# Patient Record
Sex: MALE | Race: WHITE | NOT HISPANIC OR LATINO | Employment: OTHER | URBAN - METROPOLITAN AREA
[De-identification: names, ages, dates, MRNs, and addresses within clinical notes are randomized per-mention and may not be internally consistent; named-entity substitution may affect disease eponyms.]

---

## 2017-04-19 RX ORDER — ATORVASTATIN CALCIUM 40 MG/1
TABLET, FILM COATED ORAL
Qty: 90 TABLET | Refills: 0 | Status: SHIPPED | OUTPATIENT
Start: 2017-04-19 | End: 2017-07-28 | Stop reason: SDUPTHER

## 2017-04-21 ENCOUNTER — OFFICE VISIT (OUTPATIENT)
Dept: INTERNAL MEDICINE | Age: 62
End: 2017-04-21

## 2017-04-21 VITALS
BODY MASS INDEX: 33 KG/M2 | RESPIRATION RATE: 12 BRPM | SYSTOLIC BLOOD PRESSURE: 120 MMHG | WEIGHT: 249 LBS | DIASTOLIC BLOOD PRESSURE: 60 MMHG | HEART RATE: 72 BPM | HEIGHT: 73 IN

## 2017-04-21 DIAGNOSIS — R35.1 EXCESSIVE URINATION AT NIGHT: ICD-10-CM

## 2017-04-21 DIAGNOSIS — E78.2 MIXED HYPERLIPIDEMIA: Primary | ICD-10-CM

## 2017-04-21 DIAGNOSIS — R91.8 PULMONARY NODULES: ICD-10-CM

## 2017-04-21 LAB
ALBUMIN SERPL-MCNC: 4.3 G/DL (ref 3.5–5.2)
ALBUMIN/GLOB SERPL: 1.7 G/DL
ALP SERPL-CCNC: 89 U/L (ref 39–117)
ALT SERPL-CCNC: 40 U/L (ref 1–41)
AST SERPL-CCNC: 34 U/L (ref 1–40)
BILIRUB SERPL-MCNC: 0.6 MG/DL (ref 0.1–1.2)
BUN SERPL-MCNC: 16 MG/DL (ref 8–23)
BUN/CREAT SERPL: 11.8 (ref 7–25)
CALCIUM SERPL-MCNC: 9.2 MG/DL (ref 8.6–10.5)
CHLORIDE SERPL-SCNC: 104 MMOL/L (ref 98–107)
CHOLEST SERPL-MCNC: 170 MG/DL (ref 0–200)
CO2 SERPL-SCNC: 22.4 MMOL/L (ref 22–29)
CREAT SERPL-MCNC: 1.36 MG/DL (ref 0.76–1.27)
GLOBULIN SER CALC-MCNC: 2.5 GM/DL
GLUCOSE SERPL-MCNC: 100 MG/DL (ref 65–99)
HDLC SERPL-MCNC: 51 MG/DL (ref 40–60)
LDLC SERPL CALC-MCNC: 94 MG/DL (ref 0–100)
POTASSIUM SERPL-SCNC: 4.3 MMOL/L (ref 3.5–5.2)
PROT SERPL-MCNC: 6.8 G/DL (ref 6–8.5)
PSA SERPL-MCNC: 1.12 NG/ML (ref 0–4)
SODIUM SERPL-SCNC: 139 MMOL/L (ref 136–145)
TRIGL SERPL-MCNC: 124 MG/DL (ref 0–150)
VLDLC SERPL CALC-MCNC: 24.8 MG/DL (ref 5–40)

## 2017-04-21 PROCEDURE — 99214 OFFICE O/P EST MOD 30 MIN: CPT | Performed by: INTERNAL MEDICINE

## 2017-04-21 NOTE — PROGRESS NOTES
"  Darek Oakley is a 61 y.o. male who presents with   Chief Complaint   Patient presents with   • Hyperlipidemia     Checkup; lab updates   • Pulmonary nodules.     Benign appearing pulmonary nodules-right lung.  Radiology recommended for April 2017 to end 2 year recommended follow-up   • Nocturia     Nighttime frequency dependent upon fluid consumption prior to bedtime.   .    Hyperlipidemia   This is a chronic problem. The current episode started more than 1 year ago. The problem is controlled. Current antihyperlipidemic treatment includes statins. The current treatment provides moderate improvement of lipids. There are no compliance problems.       Pulmonary nodules: This patient has 3 pulmonary nodules in the right lung that have been followed by radiology since April 20 of 2015.  These nodules appear to be benign in origin but radiology has suggested a total 2 year follow-up which will end this month.  He would like scheduling for a follow-up CT chest scan without contrast \"to put this matter to rest\".    Nocturia: History as outlined above.    The following portions of the patient's history were reviewed and updated as appropriate: allergies, current medications, past medical history and problem list.    Review of Systems   Constitutional: Negative.    HENT: Negative.    Eyes: Negative.    Respiratory: Negative.    Cardiovascular: Negative.    Genitourinary: Negative.    Musculoskeletal: Negative.    Skin: Negative.    Neurological: Negative.    Psychiatric/Behavioral: Negative.        Objective   Physical Exam   Constitutional: He is oriented to person, place, and time. He appears well-developed and well-nourished. No distress.   HENT:   Head: Normocephalic and atraumatic.   Eyes: Conjunctivae and EOM are normal. Pupils are equal, round, and reactive to light.   Neck: Normal range of motion. Neck supple. No thyromegaly present.   Neck exam negative.  Carotid auscultation normal-no bruits heard. "   Cardiovascular: Normal rate, regular rhythm, normal heart sounds and intact distal pulses.  Exam reveals no gallop and no friction rub.    No murmur heard.  Pulmonary/Chest: Effort normal and breath sounds normal. No respiratory distress. He has no wheezes. He has no rales. He exhibits no tenderness.   Neurological: He is alert and oriented to person, place, and time.   Psychiatric: He has a normal mood and affect. His behavior is normal. Judgment and thought content normal.   Nursing note and vitals reviewed.      Assessment/Plan   Darek was seen today for hyperlipidemia, pulmonary nodules. and nocturia.    Diagnoses and all orders for this visit:    Mixed hyperlipidemia  -     Comprehensive Metabolic Panel  -     Lipid Panel    Pulmonary nodules  -     CT Chest Without Contrast; Future    Excessive urination at night  -     PSA      Plan: Labs as above.Today's exam unremarkable for any new problems or events.  Continue all current treatment as prescribed.  A follow-up checkup/lab update visit is advised for 6 months.

## 2017-04-24 DIAGNOSIS — K21.9 GASTROESOPHAGEAL REFLUX DISEASE, ESOPHAGITIS PRESENCE NOT SPECIFIED: Primary | ICD-10-CM

## 2017-04-24 RX ORDER — OMEPRAZOLE 20 MG/1
CAPSULE, DELAYED RELEASE ORAL
Qty: 90 CAPSULE | Refills: 1 | Status: SHIPPED | OUTPATIENT
Start: 2017-04-24 | End: 2017-10-23 | Stop reason: SDUPTHER

## 2017-04-26 ENCOUNTER — HOSPITAL ENCOUNTER (OUTPATIENT)
Dept: CT IMAGING | Facility: HOSPITAL | Age: 62
Discharge: HOME | End: 2017-04-26
Admitting: INTERNAL MEDICINE

## 2017-04-26 DIAGNOSIS — R91.8 PULMONARY NODULES: ICD-10-CM

## 2017-04-26 PROCEDURE — 71250 CT THORAX DX C-: CPT

## 2017-05-01 ENCOUNTER — TELEPHONE (OUTPATIENT)
Dept: INTERNAL MEDICINE | Age: 62
End: 2017-05-01

## 2017-05-30 ENCOUNTER — OFFICE VISIT (OUTPATIENT)
Dept: INTERNAL MEDICINE | Age: 62
End: 2017-05-30

## 2017-05-30 VITALS
RESPIRATION RATE: 12 BRPM | SYSTOLIC BLOOD PRESSURE: 120 MMHG | DIASTOLIC BLOOD PRESSURE: 62 MMHG | HEART RATE: 70 BPM | BODY MASS INDEX: 32.47 KG/M2 | WEIGHT: 245 LBS | HEIGHT: 73 IN

## 2017-05-30 DIAGNOSIS — D17.1 LIPOMA OF TORSO: ICD-10-CM

## 2017-05-30 DIAGNOSIS — M72.2 PLANTAR FASCIITIS: ICD-10-CM

## 2017-05-30 DIAGNOSIS — J01.10 ACUTE NON-RECURRENT FRONTAL SINUSITIS: Primary | ICD-10-CM

## 2017-05-30 PROCEDURE — 99214 OFFICE O/P EST MOD 30 MIN: CPT | Performed by: INTERNAL MEDICINE

## 2017-05-30 RX ORDER — AZITHROMYCIN 250 MG/1
TABLET, FILM COATED ORAL
Qty: 6 TABLET | Refills: 0 | Status: SHIPPED | OUTPATIENT
Start: 2017-05-30 | End: 2017-10-23 | Stop reason: SDUPTHER

## 2017-07-31 RX ORDER — ATORVASTATIN CALCIUM 40 MG/1
TABLET, FILM COATED ORAL
Qty: 90 TABLET | Refills: 0 | Status: SHIPPED | OUTPATIENT
Start: 2017-07-31 | End: 2017-10-23 | Stop reason: SDUPTHER

## 2017-10-23 ENCOUNTER — OFFICE VISIT (OUTPATIENT)
Dept: INTERNAL MEDICINE | Age: 62
End: 2017-10-23

## 2017-10-23 VITALS
WEIGHT: 247 LBS | DIASTOLIC BLOOD PRESSURE: 60 MMHG | SYSTOLIC BLOOD PRESSURE: 120 MMHG | OXYGEN SATURATION: 97 % | HEART RATE: 70 BPM | TEMPERATURE: 97.3 F | HEIGHT: 73 IN | BODY MASS INDEX: 32.74 KG/M2 | RESPIRATION RATE: 12 BRPM

## 2017-10-23 DIAGNOSIS — K21.9 GASTROESOPHAGEAL REFLUX DISEASE, ESOPHAGITIS PRESENCE NOT SPECIFIED: ICD-10-CM

## 2017-10-23 DIAGNOSIS — E78.2 MIXED HYPERLIPIDEMIA: Primary | ICD-10-CM

## 2017-10-23 LAB
ALBUMIN SERPL-MCNC: 4.7 G/DL (ref 3.5–5.2)
ALBUMIN/GLOB SERPL: 2 G/DL
ALP SERPL-CCNC: 95 U/L (ref 39–117)
ALT SERPL-CCNC: 43 U/L (ref 1–41)
AST SERPL-CCNC: 32 U/L (ref 1–40)
BILIRUB SERPL-MCNC: 0.5 MG/DL (ref 0.1–1.2)
BUN SERPL-MCNC: 17 MG/DL (ref 8–23)
BUN/CREAT SERPL: 12.5 (ref 7–25)
CALCIUM SERPL-MCNC: 9.5 MG/DL (ref 8.6–10.5)
CHLORIDE SERPL-SCNC: 101 MMOL/L (ref 98–107)
CHOLEST SERPL-MCNC: 182 MG/DL (ref 0–200)
CO2 SERPL-SCNC: 24.5 MMOL/L (ref 22–29)
CREAT SERPL-MCNC: 1.36 MG/DL (ref 0.76–1.27)
GFR SERPLBLD CREATININE-BSD FMLA CKD-EPI: 53 ML/MIN/1.73
GFR SERPLBLD CREATININE-BSD FMLA CKD-EPI: 65 ML/MIN/1.73
GLOBULIN SER CALC-MCNC: 2.3 GM/DL
GLUCOSE SERPL-MCNC: 97 MG/DL (ref 65–99)
HDLC SERPL-MCNC: 44 MG/DL (ref 40–60)
LDLC SERPL CALC-MCNC: 110 MG/DL (ref 0–100)
POTASSIUM SERPL-SCNC: 4.6 MMOL/L (ref 3.5–5.2)
PROT SERPL-MCNC: 7 G/DL (ref 6–8.5)
SODIUM SERPL-SCNC: 141 MMOL/L (ref 136–145)
TRIGL SERPL-MCNC: 141 MG/DL (ref 0–150)
VLDLC SERPL CALC-MCNC: 28.2 MG/DL (ref 5–40)

## 2017-10-23 PROCEDURE — 99213 OFFICE O/P EST LOW 20 MIN: CPT | Performed by: INTERNAL MEDICINE

## 2017-10-23 RX ORDER — LEVOCETIRIZINE DIHYDROCHLORIDE 5 MG/1
TABLET, FILM COATED ORAL
Refills: 0 | COMMUNITY
Start: 2017-08-01 | End: 2017-10-23 | Stop reason: SDUPTHER

## 2017-10-23 RX ORDER — ATORVASTATIN CALCIUM 40 MG/1
40 TABLET, FILM COATED ORAL NIGHTLY
Qty: 90 TABLET | Refills: 1 | Status: SHIPPED | OUTPATIENT
Start: 2017-10-23 | End: 2018-04-17 | Stop reason: SDUPTHER

## 2017-10-23 RX ORDER — HYDROCORTISONE ACETATE AND PRAMOXINE HYDROCHLORIDE 25; 10 MG/ML; MG/ML
LOTION TOPICAL
Refills: 0 | COMMUNITY
Start: 2017-08-08 | End: 2017-10-23 | Stop reason: SDUPTHER

## 2017-10-23 RX ORDER — IBUPROFEN 800 MG/1
TABLET ORAL
Refills: 0 | COMMUNITY
Start: 2017-09-27 | End: 2019-05-06

## 2017-10-23 RX ORDER — OMEPRAZOLE 20 MG/1
20 CAPSULE, DELAYED RELEASE ORAL DAILY
Qty: 90 CAPSULE | Refills: 1 | Status: SHIPPED | OUTPATIENT
Start: 2017-10-23 | End: 2018-04-17 | Stop reason: SDUPTHER

## 2017-10-23 NOTE — PROGRESS NOTES
Darek Oakley is a 62 y.o. male who presents with   Chief Complaint   Patient presents with   • Hyperlipidemia     Checkup; lab updates.  Currently taking atorvastatin 40 mg daily   .    Hyperlipidemia   This is a chronic problem. The current episode started more than 1 year ago. The problem is controlled. Recent lipid tests were reviewed and are normal. Current antihyperlipidemic treatment includes statins. The current treatment provides moderate improvement of lipids. There are no compliance problems.         The following portions of the patient's history were reviewed and updated as appropriate: allergies, current medications, past medical history and problem list.    Review of Systems   Constitutional: Negative.    Respiratory: Negative.    Cardiovascular: Negative.    Genitourinary: Negative.    Musculoskeletal: Negative.         History of plantar fasciitis.  Currently seeing podiatry-    Neurological: Negative.    Psychiatric/Behavioral: Negative.        Objective   Physical Exam   Constitutional: He is oriented to person, place, and time. He appears well-developed and well-nourished.   HENT:   Head: Normocephalic and atraumatic.   Eyes: EOM are normal. Pupils are equal, round, and reactive to light.   Cardiovascular: Normal rate.    Pulmonary/Chest: Effort normal.   Neurological: He is alert and oriented to person, place, and time.   Psychiatric: He has a normal mood and affect. His behavior is normal. Judgment and thought content normal.   Nursing note and vitals reviewed.      Assessment/Plan   Darek was seen today for hyperlipidemia.    Diagnoses and all orders for this visit:    Mixed hyperlipidemia  -     Comprehensive Metabolic Panel  -     Lipid Panel        Plan: Labs as above.Today's exam unremarkable for any new problems or events.  Continue all current treatment as prescribed.  A follow-up checkup/lab update visit is advised for 6 months.

## 2018-03-19 ENCOUNTER — OFFICE VISIT (OUTPATIENT)
Dept: INTERNAL MEDICINE | Age: 63
End: 2018-03-19

## 2018-03-19 VITALS
WEIGHT: 251 LBS | TEMPERATURE: 98 F | HEIGHT: 73 IN | OXYGEN SATURATION: 97 % | HEART RATE: 79 BPM | BODY MASS INDEX: 33.27 KG/M2

## 2018-03-19 DIAGNOSIS — N45.1 EPIDIDYMITIS: Primary | ICD-10-CM

## 2018-03-19 DIAGNOSIS — L72.9 SCROTAL CYST: ICD-10-CM

## 2018-03-19 PROCEDURE — 99213 OFFICE O/P EST LOW 20 MIN: CPT | Performed by: INTERNAL MEDICINE

## 2018-03-19 RX ORDER — SULFAMETHOXAZOLE AND TRIMETHOPRIM 800; 160 MG/1; MG/1
1 TABLET ORAL 2 TIMES DAILY
Qty: 14 TABLET | Refills: 0 | Status: SHIPPED | OUTPATIENT
Start: 2018-03-19 | End: 2018-04-23

## 2018-03-19 NOTE — PROGRESS NOTES
"  Darek Oakley is a 62 y.o. male who presents with   Chief Complaint   Patient presents with   • Painful/swollen left testicle   .    History of Present Illness -history as above.  It has been present for 2 weeks.  He says he feels a \"knot\" in the left scrotal area.  He has a prior history of a Vasectomy.  Also has had a left hernia repair in 1985.    The following portions of the patient's history were reviewed and updated as appropriate: allergies, current medications, past medical history and problem list.    Review of Systems   Constitutional: Negative.    Respiratory: Negative.    Cardiovascular: Negative.    Genitourinary: Positive for scrotal swelling and testicular pain.   Neurological: Negative.    Psychiatric/Behavioral: Negative.        Objective   Physical Exam   Constitutional: He is oriented to person, place, and time. He appears well-developed and well-nourished.   HENT:   Head: Normocephalic and atraumatic.   Eyes: EOM are normal. Pupils are equal, round, and reactive to light.   Cardiovascular: Normal rate.    Pulmonary/Chest: Effort normal.   Genitourinary:   Genitourinary Comments: Tenderness with a nodular density in the left scrotal area.  I do not feel an actual lump on the testicle itself.  There does not appear to be any palpable hernia in either inguinal canal.   Neurological: He is alert and oriented to person, place, and time.   Psychiatric: He has a normal mood and affect. His behavior is normal. Judgment and thought content normal.   Nursing note and vitals reviewed.      Assessment/Plan   Darek was seen today for painful/swollen left testicle.    Diagnoses and all orders for this visit:    Epididymitis  -     US Testicular or Ovarian Vascular Complete; Future  -     sulfamethoxazole-trimethoprim (BACTRIM DS,SEPTRA DS) 800-160 MG per tablet; Take 1 tablet by mouth 2 (Two) Times a Day.    Scrotal cyst  -     US Testicular or Ovarian Vascular Complete; Future        Plan: Treatment as " above.  Ultrasound as above.  Advil, 2 tablets 3 times a day with food.  Follow-up as needed.

## 2018-03-21 DIAGNOSIS — N45.1 EPIDIDYMITIS: Primary | ICD-10-CM

## 2018-03-26 ENCOUNTER — HOSPITAL ENCOUNTER (OUTPATIENT)
Dept: ULTRASOUND IMAGING | Facility: HOSPITAL | Age: 63
Discharge: HOME | End: 2018-03-26
Admitting: INTERNAL MEDICINE

## 2018-03-26 DIAGNOSIS — N45.1 EPIDIDYMITIS: ICD-10-CM

## 2018-03-26 DIAGNOSIS — L72.9 SCROTAL CYST: ICD-10-CM

## 2018-03-26 PROCEDURE — 76870 US EXAM SCROTUM: CPT

## 2018-03-26 PROCEDURE — 93975 VASCULAR STUDY: CPT

## 2018-03-28 ENCOUNTER — TELEPHONE (OUTPATIENT)
Dept: INTERNAL MEDICINE | Age: 63
End: 2018-03-28

## 2018-03-28 DIAGNOSIS — N43.0 ENCYSTED HYDROCELE: Primary | ICD-10-CM

## 2018-03-28 NOTE — TELEPHONE ENCOUNTER
Spoke with pt he said they have not got any bigger but are still very painful and would like to proceed with urology referral.

## 2018-03-28 NOTE — TELEPHONE ENCOUNTER
----- Message from Yoel Peacock MD sent at 3/27/2018  7:09 PM EDT -----  Ultrasound of testicles shows no evidence of any tumor or mass. There are hydroceles ( fluid filled cysts) on both sides greater on the left and this likely represents the knot you feel. These are benign and are of no significance unless they enlarge of become painful in which case a Urology referral would be advised. No further action is needed.

## 2018-03-28 NOTE — TELEPHONE ENCOUNTER
Go ahead and take care of it as per request.  To Dr. Joseph [id number] -left hydrocele-pain

## 2018-04-17 DIAGNOSIS — K21.9 GASTROESOPHAGEAL REFLUX DISEASE, ESOPHAGITIS PRESENCE NOT SPECIFIED: ICD-10-CM

## 2018-04-17 RX ORDER — ATORVASTATIN CALCIUM 40 MG/1
TABLET, FILM COATED ORAL
Qty: 90 TABLET | Refills: 1 | Status: SHIPPED | OUTPATIENT
Start: 2018-04-17 | End: 2018-10-23 | Stop reason: SDUPTHER

## 2018-04-17 RX ORDER — OMEPRAZOLE 20 MG/1
CAPSULE, DELAYED RELEASE ORAL
Qty: 90 CAPSULE | Refills: 1 | Status: SHIPPED | OUTPATIENT
Start: 2018-04-17 | End: 2018-10-23 | Stop reason: SDUPTHER

## 2018-04-23 ENCOUNTER — OFFICE VISIT (OUTPATIENT)
Dept: INTERNAL MEDICINE | Age: 63
End: 2018-04-23

## 2018-04-23 VITALS
BODY MASS INDEX: 33.4 KG/M2 | DIASTOLIC BLOOD PRESSURE: 60 MMHG | HEIGHT: 73 IN | SYSTOLIC BLOOD PRESSURE: 120 MMHG | TEMPERATURE: 97.2 F | HEART RATE: 59 BPM | WEIGHT: 252 LBS | OXYGEN SATURATION: 98 % | RESPIRATION RATE: 12 BRPM

## 2018-04-23 DIAGNOSIS — E78.2 MIXED HYPERLIPIDEMIA: Primary | ICD-10-CM

## 2018-04-23 DIAGNOSIS — R35.1 EXCESSIVE URINATION AT NIGHT: ICD-10-CM

## 2018-04-23 LAB
ALBUMIN SERPL-MCNC: 4.1 G/DL (ref 3.5–5.2)
ALBUMIN/GLOB SERPL: 1.7 G/DL
ALP SERPL-CCNC: 80 U/L (ref 39–117)
ALT SERPL-CCNC: 39 U/L (ref 1–41)
AST SERPL-CCNC: 33 U/L (ref 1–40)
BILIRUB SERPL-MCNC: 0.4 MG/DL (ref 0.1–1.2)
BUN SERPL-MCNC: 16 MG/DL (ref 8–23)
BUN/CREAT SERPL: 11.4 (ref 7–25)
CALCIUM SERPL-MCNC: 9.5 MG/DL (ref 8.6–10.5)
CHLORIDE SERPL-SCNC: 101 MMOL/L (ref 98–107)
CHOLEST SERPL-MCNC: 185 MG/DL (ref 0–200)
CO2 SERPL-SCNC: 23.8 MMOL/L (ref 22–29)
CREAT SERPL-MCNC: 1.4 MG/DL (ref 0.76–1.27)
GFR SERPLBLD CREATININE-BSD FMLA CKD-EPI: 52 ML/MIN/1.73
GFR SERPLBLD CREATININE-BSD FMLA CKD-EPI: 62 ML/MIN/1.73
GLOBULIN SER CALC-MCNC: 2.4 GM/DL
GLUCOSE SERPL-MCNC: 95 MG/DL (ref 65–99)
HDLC SERPL-MCNC: 50 MG/DL (ref 40–60)
LDLC SERPL CALC-MCNC: 109 MG/DL (ref 0–100)
POTASSIUM SERPL-SCNC: 4.2 MMOL/L (ref 3.5–5.2)
PROT SERPL-MCNC: 6.5 G/DL (ref 6–8.5)
PSA SERPL-MCNC: 1.52 NG/ML (ref 0–4)
SODIUM SERPL-SCNC: 138 MMOL/L (ref 136–145)
TRIGL SERPL-MCNC: 129 MG/DL (ref 0–150)
VLDLC SERPL CALC-MCNC: 25.8 MG/DL (ref 5–40)

## 2018-04-23 PROCEDURE — 99214 OFFICE O/P EST MOD 30 MIN: CPT | Performed by: INTERNAL MEDICINE

## 2018-04-23 NOTE — PROGRESS NOTES
Darek Oakley is a 62 y.o. male who presents with   Chief Complaint   Patient presents with   • Hyperlipidemia   • Nocturia   .    62-year-old male presents for his six-month checkup/lab update visit.  No specific complaints or problems other than some occasional plantar fasciitis.  He does see podiatry-Dr. Grover Genao for this as needed.       Hyperlipidemia   This is a chronic problem. The current episode started more than 1 year ago. The problem is controlled. Current antihyperlipidemic treatment includes statins. The current treatment provides moderate improvement of lipids. There are no compliance problems.        Nocturia: Nighttime urinary frequency-dependent upon fluid consumption prior to bedtime.  No fever, chills or dysuria.  No visible hematuria.    The following portions of the patient's history were reviewed and updated as appropriate: allergies, current medications, past medical history and problem list.    Review of Systems   Constitutional: Negative.    HENT: Negative.    Eyes: Negative.    Respiratory: Negative.    Cardiovascular: Negative.    Genitourinary: Negative.    Musculoskeletal: Negative.    Skin: Negative.    Neurological: Negative.    Psychiatric/Behavioral: Negative.        Objective   Physical Exam   Constitutional: He is oriented to person, place, and time. He appears well-developed and well-nourished. No distress.   HENT:   Head: Normocephalic and atraumatic.   Eyes: Conjunctivae and EOM are normal. Pupils are equal, round, and reactive to light.   Neck: Normal range of motion. Neck supple. No thyromegaly present.   Neck exam negative.  Carotid auscultation normal-no bruits heard.   Cardiovascular: Normal rate, regular rhythm, normal heart sounds and intact distal pulses.  Exam reveals no gallop and no friction rub.    No murmur heard.  Pulmonary/Chest: Effort normal and breath sounds normal. No respiratory distress. He has no wheezes. He has no rales. He exhibits no tenderness.    Neurological: He is alert and oriented to person, place, and time.   Psychiatric: He has a normal mood and affect. His behavior is normal. Judgment and thought content normal.   Nursing note and vitals reviewed.      Assessment/Plan   Darek was seen today for hyperlipidemia and nocturia.    Diagnoses and all orders for this visit:    Mixed hyperlipidemia  -     Comprehensive Metabolic Panel  -     Lipid Panel    Excessive urination at night  -     PSA DIAGNOSTIC      Plan: Labs as above.Today's exam unremarkable for any new problems or events.  Continue all current treatment as prescribed.  A follow-up checkup/lab update visit is advised for 6 months assuming today's labs are all acceptable.

## 2018-07-24 ENCOUNTER — OFFICE VISIT (OUTPATIENT)
Dept: INTERNAL MEDICINE | Age: 63
End: 2018-07-24

## 2018-07-24 ENCOUNTER — HOSPITAL ENCOUNTER (OUTPATIENT)
Dept: GENERAL RADIOLOGY | Facility: HOSPITAL | Age: 63
Discharge: HOME | End: 2018-07-24
Admitting: INTERNAL MEDICINE

## 2018-07-24 VITALS
TEMPERATURE: 97.1 F | RESPIRATION RATE: 13 BRPM | DIASTOLIC BLOOD PRESSURE: 70 MMHG | WEIGHT: 248 LBS | SYSTOLIC BLOOD PRESSURE: 110 MMHG | BODY MASS INDEX: 32.87 KG/M2 | HEART RATE: 92 BPM | OXYGEN SATURATION: 97 % | HEIGHT: 73 IN

## 2018-07-24 DIAGNOSIS — M54.50 ACUTE BILATERAL LOW BACK PAIN WITHOUT SCIATICA: Primary | ICD-10-CM

## 2018-07-24 PROCEDURE — 99214 OFFICE O/P EST MOD 30 MIN: CPT | Performed by: INTERNAL MEDICINE

## 2018-07-24 PROCEDURE — 72100 X-RAY EXAM L-S SPINE 2/3 VWS: CPT

## 2018-07-24 RX ORDER — HYDROCORTISONE ACETATE AND PRAMOXINE HYDROCHLORIDE 25; 10 MG/ML; MG/ML
LOTION TOPICAL
COMMUNITY
Start: 2018-07-11 | End: 2019-05-06

## 2018-07-24 NOTE — PROGRESS NOTES
"  Darek Oakley is a 62 y.o. male who presents with   Chief Complaint   Patient presents with   • Back Pain   .    62-year-old male presents with low back pain that occurred several days ago on the 13th after he picked up something from his deck.  He had muscle spasms severely back then he said but presently he is \"80-90 percent better\".  He treated himself with ibuprofen 800 mg 3 times a day and heat applications.  He denies any radicular pain.      Back Pain   This is a new problem. The current episode started 1 to 4 weeks ago. The problem occurs intermittently. The problem has been gradually improving since onset. The pain is present in the lumbar spine and sacro-iliac. The pain does not radiate. The pain is moderate. The symptoms are aggravated by bending and position. Stiffness is present in the morning. Pertinent negatives include no numbness. He has tried NSAIDs and heat for the symptoms. The treatment provided moderate relief.        The following portions of the patient's history were reviewed and updated as appropriate: allergies, current medications, past medical history and problem list.    Review of Systems   Constitutional: Negative.    HENT: Negative.    Eyes: Negative.    Respiratory: Negative.    Cardiovascular: Negative.    Genitourinary: Negative.    Musculoskeletal: Positive for back pain.   Skin: Negative.    Neurological: Negative.  Negative for numbness.   Psychiatric/Behavioral: Negative.        Objective   Physical Exam   Constitutional: He is oriented to person, place, and time. He appears well-developed and well-nourished. No distress.   HENT:   Head: Normocephalic and atraumatic.   Eyes: Pupils are equal, round, and reactive to light. Conjunctivae and EOM are normal.   Neck: Normal range of motion. Neck supple. No thyromegaly present.   Neck exam negative.  Carotid auscultation normal-no bruits heard.   Cardiovascular: Normal rate, regular rhythm, normal heart sounds and intact distal " pulses.  Exam reveals no gallop and no friction rub.    No murmur heard.  Pulmonary/Chest: Effort normal and breath sounds normal. No respiratory distress. He has no wheezes. He has no rales. He exhibits no tenderness.   Musculoskeletal: Normal range of motion. He exhibits no edema, tenderness or deformity.   Neurological: He is alert and oriented to person, place, and time. He displays normal reflexes. No cranial nerve deficit or sensory deficit. He exhibits normal muscle tone. Coordination normal.   Psychiatric: He has a normal mood and affect. His behavior is normal. Judgment and thought content normal.   Nursing note and vitals reviewed.      Assessment/Plan   Darek was seen today for back pain.    Diagnoses and all orders for this visit:    Acute bilateral low back pain without sciatica  -     XR Spine Lumbar AP & Lateral        Plan: Continue ibuprofen 800 mg 3 times a day along with cold compresses instead of heat.  Follow-up will be dictated by need or lack of response to current treatment.    We will send him to x-ray for a plain x-ray of the lumbosacral spine today.

## 2018-07-25 ENCOUNTER — TELEPHONE (OUTPATIENT)
Dept: INTERNAL MEDICINE | Age: 63
End: 2018-07-25

## 2018-07-25 NOTE — TELEPHONE ENCOUNTER
----- Message from Yoel Peacock MD sent at 7/25/2018  7:23 AM EDT -----  Call the patient with this report.    X-ray of the lumbar spine shows some spurring (calcium buildup) at the L3-4 and L4-5 vertebra.  This is a very common finding and needs no other radiographic analysis at this time.  The rest of the lumbar spine appears to be essentially within normal limits.  Continue treatment as advised.  Follow-up as needed.

## 2018-07-26 ENCOUNTER — TELEPHONE (OUTPATIENT)
Dept: INTERNAL MEDICINE | Age: 63
End: 2018-07-26

## 2018-10-23 ENCOUNTER — TELEPHONE (OUTPATIENT)
Dept: INTERNAL MEDICINE | Age: 63
End: 2018-10-23

## 2018-10-23 ENCOUNTER — OFFICE VISIT (OUTPATIENT)
Dept: INTERNAL MEDICINE | Age: 63
End: 2018-10-23

## 2018-10-23 VITALS
WEIGHT: 245 LBS | OXYGEN SATURATION: 98 % | HEIGHT: 73 IN | SYSTOLIC BLOOD PRESSURE: 110 MMHG | DIASTOLIC BLOOD PRESSURE: 60 MMHG | BODY MASS INDEX: 32.47 KG/M2 | TEMPERATURE: 98.2 F | HEART RATE: 64 BPM | RESPIRATION RATE: 13 BRPM

## 2018-10-23 DIAGNOSIS — K21.9 GASTROESOPHAGEAL REFLUX DISEASE, ESOPHAGITIS PRESENCE NOT SPECIFIED: ICD-10-CM

## 2018-10-23 DIAGNOSIS — E78.2 MIXED HYPERLIPIDEMIA: Primary | ICD-10-CM

## 2018-10-23 LAB
ALBUMIN SERPL-MCNC: 4.7 G/DL (ref 3.5–5.2)
ALBUMIN/GLOB SERPL: 1.7 G/DL
ALP SERPL-CCNC: 109 U/L (ref 39–117)
ALT SERPL-CCNC: 35 U/L (ref 1–41)
AST SERPL-CCNC: 29 U/L (ref 1–40)
BILIRUB SERPL-MCNC: 0.5 MG/DL (ref 0.1–1.2)
BUN SERPL-MCNC: 15 MG/DL (ref 8–23)
BUN/CREAT SERPL: 9.7 (ref 7–25)
CALCIUM SERPL-MCNC: 10 MG/DL (ref 8.6–10.5)
CHLORIDE SERPL-SCNC: 101 MMOL/L (ref 98–107)
CHOLEST SERPL-MCNC: 229 MG/DL (ref 0–200)
CO2 SERPL-SCNC: 25.5 MMOL/L (ref 22–29)
CREAT SERPL-MCNC: 1.55 MG/DL (ref 0.76–1.27)
GLOBULIN SER CALC-MCNC: 2.8 GM/DL
GLUCOSE SERPL-MCNC: 99 MG/DL (ref 65–99)
HDLC SERPL-MCNC: 51 MG/DL (ref 40–60)
LDLC SERPL CALC-MCNC: 155 MG/DL (ref 0–100)
POTASSIUM SERPL-SCNC: 4.9 MMOL/L (ref 3.5–5.2)
PROT SERPL-MCNC: 7.5 G/DL (ref 6–8.5)
SODIUM SERPL-SCNC: 139 MMOL/L (ref 136–145)
TRIGL SERPL-MCNC: 117 MG/DL (ref 0–150)
VLDLC SERPL CALC-MCNC: 23.4 MG/DL (ref 5–40)

## 2018-10-23 PROCEDURE — 99214 OFFICE O/P EST MOD 30 MIN: CPT | Performed by: INTERNAL MEDICINE

## 2018-10-23 RX ORDER — ATORVASTATIN CALCIUM 40 MG/1
40 TABLET, FILM COATED ORAL EVERY EVENING
Qty: 90 TABLET | Refills: 1 | Status: SHIPPED | OUTPATIENT
Start: 2018-10-23 | End: 2019-04-18 | Stop reason: SDUPTHER

## 2018-10-23 RX ORDER — OMEPRAZOLE 20 MG/1
20 CAPSULE, DELAYED RELEASE ORAL DAILY
Qty: 90 CAPSULE | Refills: 1 | Status: SHIPPED | OUTPATIENT
Start: 2018-10-23 | End: 2019-04-18 | Stop reason: SDUPTHER

## 2018-10-23 NOTE — PROGRESS NOTES
Darek Oakley is a 63 y.o. male who presents with   Chief Complaint   Patient presents with   • Hyperlipidemia     Six-month checkup/lab update visit.  No complaints or problems.   • Requests flu shot   .    63-year-old male.  Six-month checkup/lab update visit.  No complaints on today's visit.  Requests flu shot as noted above.      Hyperlipidemia   This is a chronic problem. The current episode started more than 1 year ago. The problem is controlled. Recent lipid tests were reviewed and are normal. Current antihyperlipidemic treatment includes statins. The current treatment provides significant improvement of lipids. There are no compliance problems.         The following portions of the patient's history were reviewed and updated as appropriate: allergies, current medications, past medical history and problem list.    Review of Systems   Constitutional: Negative.    HENT: Negative.    Eyes: Negative.    Respiratory: Negative.    Cardiovascular: Negative.    Genitourinary: Negative.    Musculoskeletal: Negative.    Skin: Negative.    Neurological: Negative.    Psychiatric/Behavioral: Negative.        Objective   Physical Exam   Constitutional: He is oriented to person, place, and time. He appears well-developed and well-nourished. No distress.   HENT:   Head: Normocephalic and atraumatic.   Eyes: Pupils are equal, round, and reactive to light. Conjunctivae and EOM are normal.   Neck: Normal range of motion. Neck supple. No thyromegaly present.   Neck exam negative.  Carotid auscultation normal-no bruits heard.   Cardiovascular: Normal rate, regular rhythm, normal heart sounds and intact distal pulses.  Exam reveals no gallop and no friction rub.    No murmur heard.  Pulmonary/Chest: Effort normal and breath sounds normal. No respiratory distress. He has no wheezes. He has no rales. He exhibits no tenderness.   Neurological: He is alert and oriented to person, place, and time.   Psychiatric: He has a normal mood  and affect. His behavior is normal. Judgment and thought content normal.   Nursing note and vitals reviewed.      Assessment/Plan   Darek was seen today for hyperlipidemia and requests flu shot.    Diagnoses and all orders for this visit:    Mixed hyperlipidemia  -     Comprehensive Metabolic Panel  -     Lipid Panel        Plan: Labs as above.Today's exam unremarkable for any new problems or events.  Continue all current treatment as prescribed.  A follow-up checkup/lab update visit is advised for 6 months assuming today's labs are all acceptable.    Flu shot today per request.

## 2018-10-23 NOTE — TELEPHONE ENCOUNTER
atorvastatin (LIPITOR) 40 MG tablet  omeprazole (priLOSEC) 20 MG capsule    [id number] - [facility] [address] AT [facility] OF [facility] - [phone] [facility] [phone] FX    Pt seen today need refills    Thank you

## 2019-04-18 DIAGNOSIS — K21.9 GASTROESOPHAGEAL REFLUX DISEASE, ESOPHAGITIS PRESENCE NOT SPECIFIED: ICD-10-CM

## 2019-04-18 RX ORDER — OMEPRAZOLE 20 MG/1
20 CAPSULE, DELAYED RELEASE ORAL DAILY
Qty: 90 CAPSULE | Refills: 0 | Status: SHIPPED | OUTPATIENT
Start: 2019-04-18 | End: 2019-05-06 | Stop reason: SDUPTHER

## 2019-04-18 RX ORDER — ATORVASTATIN CALCIUM 40 MG/1
40 TABLET, FILM COATED ORAL EVERY EVENING
Qty: 90 TABLET | Refills: 0 | Status: SHIPPED | OUTPATIENT
Start: 2019-04-18 | End: 2019-05-06 | Stop reason: SDUPTHER

## 2019-05-06 ENCOUNTER — OFFICE VISIT (OUTPATIENT)
Dept: INTERNAL MEDICINE | Age: 64
End: 2019-05-06

## 2019-05-06 VITALS
WEIGHT: 245.8 LBS | HEART RATE: 62 BPM | BODY MASS INDEX: 32.58 KG/M2 | DIASTOLIC BLOOD PRESSURE: 70 MMHG | TEMPERATURE: 97.9 F | SYSTOLIC BLOOD PRESSURE: 120 MMHG | HEIGHT: 73 IN | OXYGEN SATURATION: 98 % | RESPIRATION RATE: 13 BRPM

## 2019-05-06 DIAGNOSIS — E78.2 MIXED HYPERLIPIDEMIA: Primary | ICD-10-CM

## 2019-05-06 DIAGNOSIS — K21.9 GASTROESOPHAGEAL REFLUX DISEASE, ESOPHAGITIS PRESENCE NOT SPECIFIED: ICD-10-CM

## 2019-05-06 DIAGNOSIS — Z00.00 HEALTHCARE MAINTENANCE: ICD-10-CM

## 2019-05-06 LAB
ALBUMIN SERPL-MCNC: 4.6 G/DL (ref 3.5–5.2)
ALBUMIN/GLOB SERPL: 2 G/DL
ALP SERPL-CCNC: 105 U/L (ref 39–117)
ALT SERPL-CCNC: 33 U/L (ref 1–41)
AST SERPL-CCNC: 27 U/L (ref 1–40)
BILIRUB SERPL-MCNC: 0.5 MG/DL (ref 0.2–1.2)
BUN SERPL-MCNC: 16 MG/DL (ref 8–23)
BUN/CREAT SERPL: 11.3 (ref 7–25)
CALCIUM SERPL-MCNC: 9.8 MG/DL (ref 8.6–10.5)
CHLORIDE SERPL-SCNC: 104 MMOL/L (ref 98–107)
CHOLEST SERPL-MCNC: 152 MG/DL (ref 0–200)
CO2 SERPL-SCNC: 25.8 MMOL/L (ref 22–29)
CREAT SERPL-MCNC: 1.42 MG/DL (ref 0.76–1.27)
GLOBULIN SER CALC-MCNC: 2.3 GM/DL
GLUCOSE SERPL-MCNC: 102 MG/DL (ref 65–99)
HDLC SERPL-MCNC: 42 MG/DL (ref 40–60)
LDLC SERPL CALC-MCNC: 86 MG/DL (ref 0–100)
POTASSIUM SERPL-SCNC: 4.7 MMOL/L (ref 3.5–5.2)
PROT SERPL-MCNC: 6.9 G/DL (ref 6–8.5)
SODIUM SERPL-SCNC: 141 MMOL/L (ref 136–145)
TRIGL SERPL-MCNC: 119 MG/DL (ref 0–150)
VLDLC SERPL CALC-MCNC: 23.8 MG/DL

## 2019-05-06 PROCEDURE — 99214 OFFICE O/P EST MOD 30 MIN: CPT | Performed by: INTERNAL MEDICINE

## 2019-05-06 RX ORDER — ATORVASTATIN CALCIUM 40 MG/1
40 TABLET, FILM COATED ORAL EVERY EVENING
Qty: 90 TABLET | Refills: 1 | Status: SHIPPED | OUTPATIENT
Start: 2019-05-06 | End: 2019-07-15 | Stop reason: SDUPTHER

## 2019-05-06 RX ORDER — OMEPRAZOLE 20 MG/1
20 CAPSULE, DELAYED RELEASE ORAL DAILY
Qty: 90 CAPSULE | Refills: 1 | Status: SHIPPED | OUTPATIENT
Start: 2019-05-06 | End: 2019-07-15 | Stop reason: SDUPTHER

## 2019-05-06 NOTE — PROGRESS NOTES
"  Darek Oakley is a 63 y.o. male who presents with   Chief Complaint   Patient presents with   • Hyperlipidemia   • Requests referral for colonoscopy     2008 at [facility]-Dr. Mccarthy he says-normal.  Requests referral back to him.   • Bilateral wrist pain     Right greater than the left; occurs if he lifts heavy objects   • Lines seem to vibrate     Right eye.  Sees ophthalmology group associated with [facility].  Will make his own appointment to go see them   • Right ear itches     External auditory canal area   .    63-year-old male presents for his 6-month checkup/lab update visit and also requests referral for colonoscopy screening update as noted from the history above.  In addition he had wrist pain, complaints that lines vibrate and his right ear itches as per history above.      Hyperlipidemia   This is a chronic problem. The current episode started more than 1 year ago. The problem is controlled. Recent lipid tests were reviewed and are normal. Current antihyperlipidemic treatment includes statins. The current treatment provides moderate improvement of lipids. There are no compliance problems.       History as outlined above: Each problem discussed as noted.    The following portions of the patient's history were reviewed and updated as appropriate: allergies, current medications, past medical history and problem list.    Review of Systems   Constitutional: Negative.    HENT: Negative.         Right ear itches as per history above   Eyes: Negative.         Lines vibrate as per history above.  It seems the right eye has more of it than the left.  He says when he looks in the mirror he occasionally will see his \"right eye jerking\".  He says the problem seems to come and go.   Respiratory: Negative.    Cardiovascular: Negative.    Genitourinary: Negative.    Musculoskeletal: Negative.         Wrist pains as per history above   Skin: Negative.    Neurological: Negative.    Psychiatric/Behavioral: " Negative.        Objective   Physical Exam   Constitutional: He is oriented to person, place, and time. He appears well-developed and well-nourished. No distress.   HENT:   Head: Normocephalic and atraumatic.   Right Ear: External ear normal.   Left Ear: External ear normal.   Nose: Nose normal.   Mouth/Throat: Oropharynx is clear and moist.   Eyes: Conjunctivae and EOM are normal. Pupils are equal, round, and reactive to light.   Neck: Normal range of motion. Neck supple. No thyromegaly present.   Neck exam negative.  Carotid auscultation normal-no bruits heard.   Cardiovascular: Normal rate, regular rhythm, normal heart sounds and intact distal pulses. Exam reveals no gallop and no friction rub.   No murmur heard.  Pulmonary/Chest: Effort normal and breath sounds normal. No respiratory distress. He has no wheezes. He has no rales. He exhibits no tenderness.   Neurological: He is alert and oriented to person, place, and time.   Psychiatric: He has a normal mood and affect. His behavior is normal. Judgment and thought content normal.   Nursing note and vitals reviewed.      Assessment/Plan   Darek was seen today for hyperlipidemia, requests referral for colonoscopy, bilateral wrist pain, lines seem to vibrate and right ear itches.    Diagnoses and all orders for this visit:    Mixed hyperlipidemia  -     Comprehensive Metabolic Panel  -     Lipid Panel    Gastroesophageal reflux disease, esophagitis presence not specified  -     omeprazole (priLOSEC) 20 MG capsule; Take 1 capsule by mouth Daily.  -     Comprehensive Metabolic Panel    Healthcare maintenance  -     Ambulatory Referral to Gastroenterology    Other orders  -     atorvastatin (LIPITOR) 40 MG tablet; Take 1 tablet by mouth Every Evening.      Plan: Labs as above for the issues as outlined.  Continue treatment as prescribed.  A 6-month checkup/lab update visit is advised.    Bilateral wrist pain: He declines x-rays.  Will take Advil, 2 tablets 3 times  "daily with food as needed.    \"Lines vibrate\": He will make his own ophthalmology appointment with the group that he goes to affiliated with [facility] he says.    \"Right ear itches\": He will try OTC Cortaid cream a couple of times daily topically.         "

## 2019-05-09 ENCOUNTER — PREP FOR SURGERY (OUTPATIENT)
Dept: OTHER | Facility: HOSPITAL | Age: 64
End: 2019-05-09

## 2019-05-09 DIAGNOSIS — Z12.11 ENCOUNTER FOR SCREENING FOR MALIGNANT NEOPLASM OF COLON: Primary | ICD-10-CM

## 2019-05-09 RX ORDER — SODIUM CHLORIDE, SODIUM LACTATE, POTASSIUM CHLORIDE, CALCIUM CHLORIDE 600; 310; 30; 20 MG/100ML; MG/100ML; MG/100ML; MG/100ML
30 INJECTION, SOLUTION INTRAVENOUS CONTINUOUS
Status: CANCELLED | OUTPATIENT
Start: 2019-05-31

## 2019-05-10 PROBLEM — Z12.11 ENCOUNTER FOR SCREENING FOR MALIGNANT NEOPLASM OF COLON: Status: ACTIVE | Noted: 2019-05-10

## 2019-05-24 ENCOUNTER — TELEPHONE (OUTPATIENT)
Dept: GASTROENTEROLOGY | Facility: CLINIC | Age: 64
End: 2019-05-24

## 2019-05-31 ENCOUNTER — HOSPITAL ENCOUNTER (OUTPATIENT)
Facility: HOSPITAL | Age: 64
Setting detail: HOSPITAL OUTPATIENT SURGERY
Discharge: HOME | End: 2019-05-31
Attending: INTERNAL MEDICINE | Admitting: INTERNAL MEDICINE

## 2019-05-31 ENCOUNTER — ANESTHESIA (OUTPATIENT)
Dept: GASTROENTEROLOGY | Facility: HOSPITAL | Age: 64
End: 2019-05-31

## 2019-05-31 ENCOUNTER — ANESTHESIA EVENT (OUTPATIENT)
Dept: GASTROENTEROLOGY | Facility: HOSPITAL | Age: 64
End: 2019-05-31

## 2019-05-31 VITALS
TEMPERATURE: 97.9 F | OXYGEN SATURATION: 98 % | RESPIRATION RATE: 18 BRPM | SYSTOLIC BLOOD PRESSURE: 107 MMHG | HEART RATE: 66 BPM | WEIGHT: 241.8 LBS | HEIGHT: 73 IN | DIASTOLIC BLOOD PRESSURE: 83 MMHG | BODY MASS INDEX: 32.05 KG/M2

## 2019-05-31 DIAGNOSIS — Z12.11 ENCOUNTER FOR SCREENING FOR MALIGNANT NEOPLASM OF COLON: ICD-10-CM

## 2019-05-31 PROCEDURE — 25010000002 PROPOFOL 10 MG/ML EMULSION: Performed by: NURSE ANESTHETIST, CERTIFIED REGISTERED

## 2019-05-31 PROCEDURE — 45385 COLONOSCOPY W/LESION REMOVAL: CPT | Performed by: INTERNAL MEDICINE

## 2019-05-31 PROCEDURE — 88305 TISSUE EXAM BY PATHOLOGIST: CPT | Performed by: INTERNAL MEDICINE

## 2019-05-31 RX ORDER — LIDOCAINE HYDROCHLORIDE 20 MG/ML
INJECTION, SOLUTION INFILTRATION; PERINEURAL AS NEEDED
Status: DISCONTINUED | OUTPATIENT
Start: 2019-05-31 | End: 2019-05-31 | Stop reason: SURG

## 2019-05-31 RX ORDER — PROPOFOL 10 MG/ML
VIAL (ML) INTRAVENOUS AS NEEDED
Status: DISCONTINUED | OUTPATIENT
Start: 2019-05-31 | End: 2019-05-31 | Stop reason: SURG

## 2019-05-31 RX ORDER — SODIUM CHLORIDE 0.9 % (FLUSH) 0.9 %
3 SYRINGE (ML) INJECTION AS NEEDED
Status: DISCONTINUED | OUTPATIENT
Start: 2019-05-31 | End: 2019-05-31 | Stop reason: HOSPADM

## 2019-05-31 RX ORDER — PROPOFOL 10 MG/ML
VIAL (ML) INTRAVENOUS CONTINUOUS PRN
Status: DISCONTINUED | OUTPATIENT
Start: 2019-05-31 | End: 2019-05-31 | Stop reason: SURG

## 2019-05-31 RX ORDER — DIPHENHYDRAMINE HCL 25 MG
75 CAPSULE ORAL NIGHTLY PRN
COMMUNITY

## 2019-05-31 RX ORDER — IBUPROFEN 200 MG
400 TABLET ORAL EVERY 6 HOURS PRN
COMMUNITY
End: 2021-04-13

## 2019-05-31 RX ORDER — SODIUM CHLORIDE, SODIUM LACTATE, POTASSIUM CHLORIDE, CALCIUM CHLORIDE 600; 310; 30; 20 MG/100ML; MG/100ML; MG/100ML; MG/100ML
30 INJECTION, SOLUTION INTRAVENOUS CONTINUOUS
Status: DISCONTINUED | OUTPATIENT
Start: 2019-05-31 | End: 2019-05-31 | Stop reason: HOSPADM

## 2019-05-31 RX ADMIN — PROPOFOL 100 MG: 10 INJECTION, EMULSION INTRAVENOUS at 09:53

## 2019-05-31 RX ADMIN — SODIUM CHLORIDE, POTASSIUM CHLORIDE, SODIUM LACTATE AND CALCIUM CHLORIDE 30 ML/HR: 600; 310; 30; 20 INJECTION, SOLUTION INTRAVENOUS at 09:00

## 2019-05-31 RX ADMIN — PROPOFOL 250 MCG/KG/MIN: 10 INJECTION, EMULSION INTRAVENOUS at 09:54

## 2019-05-31 RX ADMIN — LIDOCAINE HYDROCHLORIDE 60 MG: 20 INJECTION, SOLUTION INFILTRATION; PERINEURAL at 09:51

## 2019-05-31 NOTE — ANESTHESIA PREPROCEDURE EVALUATION
Anesthesia Evaluation     Patient summary reviewed and Nursing notes reviewed   NPO Solid Status: > 8 hours  NPO Liquid Status: > 8 hours           Airway   Mallampati: II  TM distance: >3 FB  Neck ROM: full  Possible difficult intubation  Dental - normal exam     Pulmonary - normal exam   Cardiovascular - normal exam        Neuro/Psych  GI/Hepatic/Renal/Endo    (+) obesity,       Musculoskeletal     Abdominal    Substance History      OB/GYN          Other                        Anesthesia Plan    ASA 2     MAC     Anesthetic plan, all risks, benefits, and alternatives have been provided, discussed and informed consent has been obtained with: patient.

## 2019-05-31 NOTE — ANESTHESIA POSTPROCEDURE EVALUATION
"Patient: Darek Oakley    Procedure Summary     Date:  05/31/19 Room / Location:  [facility]  [facility]    Anesthesia Start:  0949 Anesthesia Stop:  1027    Procedure:  COLONOSCOPY into cecum with cold snare polypectomies (N/A ) Diagnosis:       Encounter for screening for malignant neoplasm of colon      (Encounter for screening for malignant neoplasm of colon [Z12.11])    Surgeon:  Stuart Scott MD Provider:  Leana Lowry MD    Anesthesia Type:  MAC ASA Status:  2          Anesthesia Type: MAC  Last vitals  BP   98/66 (05/31/19 1026)   Temp   36.6 °C (97.9 °F) (05/31/19 0852)   Pulse   69 (05/31/19 1026)   Resp   16 (05/31/19 1026)     SpO2   95 % (05/31/19 1026)     Post Anesthesia Care and Evaluation    Patient location during evaluation: PACU  Patient participation: complete - patient participated  Level of consciousness: awake  Pain score: 0  Pain management: adequate  Airway patency: patent  Anesthetic complications: No anesthetic complications    Cardiovascular status: acceptable  Respiratory status: acceptable  Hydration status: acceptable    Comments: Blood pressure 98/66, pulse 69, temperature 36.6 °C (97.9 °F), temperature source Oral, resp. rate 16, height 185.4 cm (73\"), weight 110 kg (241 lb 12.8 oz), SpO2 95 %.    No anesthesia care post op    "

## 2019-06-03 LAB
CYTO UR: NORMAL
LAB AP CASE REPORT: NORMAL
PATH REPORT.FINAL DX SPEC: NORMAL
PATH REPORT.GROSS SPEC: NORMAL

## 2019-07-15 DIAGNOSIS — K21.9 GASTROESOPHAGEAL REFLUX DISEASE, ESOPHAGITIS PRESENCE NOT SPECIFIED: ICD-10-CM

## 2019-07-15 RX ORDER — OMEPRAZOLE 20 MG/1
20 CAPSULE, DELAYED RELEASE ORAL DAILY
Qty: 90 CAPSULE | Refills: 0 | Status: SHIPPED | OUTPATIENT
Start: 2019-07-15 | End: 2020-03-30

## 2019-07-15 RX ORDER — ATORVASTATIN CALCIUM 40 MG/1
40 TABLET, FILM COATED ORAL EVERY EVENING
Qty: 90 TABLET | Refills: 0 | Status: SHIPPED | OUTPATIENT
Start: 2019-07-15 | End: 2020-03-30

## 2019-11-06 ENCOUNTER — OFFICE VISIT (OUTPATIENT)
Dept: INTERNAL MEDICINE | Age: 64
End: 2019-11-06

## 2019-11-06 VITALS
OXYGEN SATURATION: 98 % | DIASTOLIC BLOOD PRESSURE: 70 MMHG | WEIGHT: 247.2 LBS | HEIGHT: 73 IN | BODY MASS INDEX: 32.76 KG/M2 | HEART RATE: 67 BPM | RESPIRATION RATE: 13 BRPM | SYSTOLIC BLOOD PRESSURE: 110 MMHG | TEMPERATURE: 98.1 F

## 2019-11-06 DIAGNOSIS — E78.2 MIXED HYPERLIPIDEMIA: ICD-10-CM

## 2019-11-06 DIAGNOSIS — Z00.00 HEALTHCARE MAINTENANCE: ICD-10-CM

## 2019-11-06 DIAGNOSIS — Z23 NEED FOR INFLUENZA VACCINATION: Primary | ICD-10-CM

## 2019-11-06 PROCEDURE — 90471 IMMUNIZATION ADMIN: CPT | Performed by: INTERNAL MEDICINE

## 2019-11-06 PROCEDURE — 99213 OFFICE O/P EST LOW 20 MIN: CPT | Performed by: INTERNAL MEDICINE

## 2019-11-06 PROCEDURE — 90674 CCIIV4 VAC NO PRSV 0.5 ML IM: CPT | Performed by: INTERNAL MEDICINE

## 2019-11-06 NOTE — PROGRESS NOTES
"Darek Oakley is a 64 y.o. male who presents with   Chief Complaint   Patient presents with   • Hyperlipidemia     Atorvastatin 40 mg daily   • Requests flu shot   .    64-year-old male.  6-month checkup/lab update visit.  No complaints or problems.  Requests flu shot.      Hyperlipidemia   This is a chronic problem. The current episode started more than 1 year ago. The problem is controlled. Recent lipid tests were reviewed and are normal. Current antihyperlipidemic treatment includes statins. The current treatment provides moderate improvement of lipids. Compliance problems include adherence to diet and adherence to exercise.         /70   Pulse 67   Temp 98.1 °F (36.7 °C)   Resp 13   Ht 185.4 cm (72.99\")   Wt 112 kg (247 lb 3.2 oz)   SpO2 98%   BMI 32.62 kg/m²     The following portions of the patient's history were reviewed and updated as appropriate: allergies, current medications, past medical history and problem list.    Review of Systems   Constitutional: Negative.    HENT: Negative.    Eyes: Negative.    Respiratory: Negative.    Cardiovascular: Negative.    Genitourinary: Negative.    Musculoskeletal: Negative.    Skin: Negative.    Neurological: Negative.    Psychiatric/Behavioral: Negative.        Objective   Physical Exam   Constitutional: He is oriented to person, place, and time. He appears well-developed and well-nourished. No distress.   HENT:   Head: Normocephalic and atraumatic.   Eyes: Conjunctivae and EOM are normal. Pupils are equal, round, and reactive to light.   Neck: Normal range of motion. Neck supple. No thyromegaly present.   Neck exam negative.  Carotid auscultation normal-no bruits heard.   Cardiovascular: Normal rate, regular rhythm, normal heart sounds and intact distal pulses. Exam reveals no gallop and no friction rub.   No murmur heard.  Pulmonary/Chest: Effort normal and breath sounds normal. No respiratory distress. He has no wheezes. He has no rales. He exhibits " no tenderness.   Neurological: He is alert and oriented to person, place, and time.   Psychiatric: He has a normal mood and affect. His behavior is normal. Judgment and thought content normal.   Nursing note and vitals reviewed.      Assessment/Plan   Darek was seen today for hyperlipidemia and requests flu shot.    Diagnoses and all orders for this visit:    Need for influenza vaccination  -     Flucelvax Quad=>4Years ([id number])    Mixed hyperlipidemia  -     Comprehensive Metabolic Panel  -     Lipid Panel    Healthcare maintenance  -     Hepatitis C Antibody      Plan: Labs as above.Today's exam unremarkable for any new problems or events.  Continue all current treatment as prescribed.  A follow-up checkup/lab update visit is advised for 6 months assuming today's labs are all acceptable.    Flu shot given today per request.    Annual physical suggested-declined

## 2019-11-07 LAB
ALBUMIN SERPL-MCNC: 4.5 G/DL (ref 3.5–5.2)
ALBUMIN/GLOB SERPL: 1.9 G/DL
ALP SERPL-CCNC: 87 U/L (ref 39–117)
ALT SERPL-CCNC: 29 U/L (ref 1–41)
AST SERPL-CCNC: 25 U/L (ref 1–40)
BILIRUB SERPL-MCNC: 0.5 MG/DL (ref 0.2–1.2)
BUN SERPL-MCNC: 16 MG/DL (ref 8–23)
BUN/CREAT SERPL: 10.5 (ref 7–25)
CALCIUM SERPL-MCNC: 9.4 MG/DL (ref 8.6–10.5)
CHLORIDE SERPL-SCNC: 101 MMOL/L (ref 98–107)
CHOLEST SERPL-MCNC: 193 MG/DL (ref 0–200)
CO2 SERPL-SCNC: 23.7 MMOL/L (ref 22–29)
CREAT SERPL-MCNC: 1.53 MG/DL (ref 0.76–1.27)
GLOBULIN SER CALC-MCNC: 2.4 GM/DL
GLUCOSE SERPL-MCNC: 96 MG/DL (ref 65–99)
HCV AB S/CO SERPL IA: <0.1 S/CO RATIO (ref 0–0.9)
HDLC SERPL-MCNC: 47 MG/DL (ref 40–60)
LDLC SERPL CALC-MCNC: 120 MG/DL (ref 0–100)
POTASSIUM SERPL-SCNC: 4.4 MMOL/L (ref 3.5–5.2)
PROT SERPL-MCNC: 6.9 G/DL (ref 6–8.5)
SODIUM SERPL-SCNC: 138 MMOL/L (ref 136–145)
TRIGL SERPL-MCNC: 131 MG/DL (ref 0–150)
VLDLC SERPL CALC-MCNC: 26.2 MG/DL

## 2020-03-29 DIAGNOSIS — K21.9 GASTROESOPHAGEAL REFLUX DISEASE, ESOPHAGITIS PRESENCE NOT SPECIFIED: ICD-10-CM

## 2020-03-30 RX ORDER — OMEPRAZOLE 20 MG/1
20 CAPSULE, DELAYED RELEASE ORAL DAILY
Qty: 90 CAPSULE | Refills: 2 | Status: SHIPPED | OUTPATIENT
Start: 2020-03-30 | End: 2020-12-21

## 2020-03-30 RX ORDER — ATORVASTATIN CALCIUM 40 MG/1
40 TABLET, FILM COATED ORAL EVERY EVENING
Qty: 90 TABLET | Refills: 2 | Status: SHIPPED | OUTPATIENT
Start: 2020-03-30 | End: 2020-12-21

## 2020-05-06 ENCOUNTER — TELEMEDICINE (OUTPATIENT)
Dept: INTERNAL MEDICINE | Age: 65
End: 2020-05-06

## 2020-05-06 ENCOUNTER — TELEPHONE (OUTPATIENT)
Dept: INTERNAL MEDICINE | Age: 65
End: 2020-05-06

## 2020-05-06 DIAGNOSIS — M54.50 CHRONIC BILATERAL LOW BACK PAIN WITHOUT SCIATICA: ICD-10-CM

## 2020-05-06 DIAGNOSIS — E78.2 MIXED HYPERLIPIDEMIA: Primary | ICD-10-CM

## 2020-05-06 DIAGNOSIS — G89.29 CHRONIC BILATERAL LOW BACK PAIN WITHOUT SCIATICA: ICD-10-CM

## 2020-05-06 PROCEDURE — 99214 OFFICE O/P EST MOD 30 MIN: CPT | Performed by: INTERNAL MEDICINE

## 2020-05-06 NOTE — TELEPHONE ENCOUNTER
----- Message from Yoel Peacock MD sent at 5/6/2020  1:41 PM EDT -----  Call and set patient up with a follow-up office visit with lab updates for sometime in November

## 2020-05-06 NOTE — PROGRESS NOTES
Darek Oakley is a 64 y.o. male who presents with   Chief Complaint   Patient presents with   • Hyperlipidemia     MyChart video visit due to coronavirus pandemic; atorvastatin 40 mg daily   • Back Pain     Chronic problem.  Pain occurs off and on.  Currently it is on to a mild degree he says.  No injuries.   .    64-year-old male MyChart video visit.  Last office visit November 6, 2019 and today would have been his 6-month checkup today.  His visit from November was reviewed and labs were reviewed at that time.  With minor variations there was no significant change in the comprehensive panel or lipid panel when compared to labs over the past year.  He currently continues to take all of his listed medications without any side effects.  His only issue is recurrence of low back pain.  He describes it as mild with no radicular radiation.  He is taking over-the-counter Advil 2 twice daily but does not seem to be getting any relief as of yet.  The discomfort has been going on for several days and interferes somewhat with his sleep he says.    Hyperlipidemia   This is a chronic problem. The current episode started more than 1 year ago. The problem is controlled. Recent lipid tests were reviewed and are normal. Current antihyperlipidemic treatment includes statins. The current treatment provides moderate improvement of lipids. Compliance problems include adherence to diet and adherence to exercise.    Back Pain   This is a chronic problem. The current episode started more than 1 year ago. The problem has been waxing and waning since onset. The pain is present in the lumbar spine. The pain is mild. The symptoms are aggravated by lying down and twisting. Pertinent negatives include no numbness or weakness. He has tried NSAIDs for the symptoms. The treatment provided mild relief.        There were no vitals taken for this visit.- Due to the nature of today's my chart video visit, vital signs could not be performed.  He is  "not monitoring his blood pressure at home he says.      The following portions of the patient's history were reviewed and updated as appropriate: allergies, current medications, past medical history and problem list.    Review of Systems   Constitutional: Negative.    HENT: Negative.    Eyes: Negative.    Respiratory: Negative.    Cardiovascular: Negative.    Genitourinary: Negative.    Musculoskeletal: Positive for back pain.   Skin: Negative.    Neurological: Negative.  Negative for weakness and numbness.   Psychiatric/Behavioral: Negative.        Objective   Physical Exam   Constitutional: He is oriented to person, place, and time. He appears well-developed and well-nourished.   The physical observations noted on this exam were based on those seen at the time of the MyChart video visit and were not based on an actual in person office visit   HENT:   Head: Normocephalic.   Eyes: EOM are normal.   Neck: Normal range of motion.   Cardiovascular:   No cardiac complaints were noted   Pulmonary/Chest: Effort normal.   Musculoskeletal: Normal range of motion.   Neurological: He is alert and oriented to person, place, and time.   Psychiatric: He has a normal mood and affect. His behavior is normal. Judgment and thought content normal.       Assessment/Plan   Darek was seen today for hyperlipidemia and back pain.    Diagnoses and all orders for this visit:    Mixed hyperlipidemia    Chronic bilateral low back pain without sciatica      Plan: The patient's lipid profiles were reviewed over the past year and with minor variations there are no significant changes.  He will continue atorvastatin as prescribed at 40 mg daily and we will plan on seeing him in November for a follow-up office visit checkup with lab updates then.    Low back pain: His x-rays from July 2018 were reviewed with him and at that time there was \"very minimal anterior spurring at L3-4 and L4-5\".  He has not had any x-rays or MRIs since.  Based on his " symptoms as described above I have suggested he use Advil (ibuprofen) at a minimum anti-inflammatory/pain dose of 2 tablets 3 times daily with food up to a maximum daily dose of 4 tablets 3 times daily with food.  If problems persist an office visit has been advised.    I have performed this clinical encounter utilizing real-time telehealth video connection between my location and the patient's location at home.  Verbal consent was obtained from the patient to perform this video clinical encounter and any questions the patient had regarding this counter were addressed.

## 2020-11-17 ENCOUNTER — OFFICE VISIT (OUTPATIENT)
Dept: INTERNAL MEDICINE | Age: 65
End: 2020-11-17

## 2020-11-17 VITALS
DIASTOLIC BLOOD PRESSURE: 60 MMHG | TEMPERATURE: 96.9 F | OXYGEN SATURATION: 99 % | HEIGHT: 73 IN | BODY MASS INDEX: 32.13 KG/M2 | HEART RATE: 63 BPM | WEIGHT: 242.4 LBS | SYSTOLIC BLOOD PRESSURE: 130 MMHG | RESPIRATION RATE: 13 BRPM

## 2020-11-17 DIAGNOSIS — E78.2 MIXED HYPERLIPIDEMIA: ICD-10-CM

## 2020-11-17 DIAGNOSIS — R41.3 SHORT-TERM MEMORY LOSS: Primary | ICD-10-CM

## 2020-11-17 LAB
ALBUMIN SERPL-MCNC: 4.5 G/DL (ref 3.5–5.2)
ALBUMIN/GLOB SERPL: 2 G/DL
ALP SERPL-CCNC: 95 U/L (ref 39–117)
ALT SERPL-CCNC: 36 U/L (ref 1–41)
AST SERPL-CCNC: 28 U/L (ref 1–40)
BILIRUB SERPL-MCNC: 0.5 MG/DL (ref 0–1.2)
BUN SERPL-MCNC: 14 MG/DL (ref 8–23)
BUN/CREAT SERPL: 11.2 (ref 7–25)
CALCIUM SERPL-MCNC: 9.3 MG/DL (ref 8.6–10.5)
CHLORIDE SERPL-SCNC: 101 MMOL/L (ref 98–107)
CHOLEST SERPL-MCNC: 174 MG/DL (ref 0–200)
CO2 SERPL-SCNC: 27.1 MMOL/L (ref 22–29)
CREAT SERPL-MCNC: 1.25 MG/DL (ref 0.76–1.27)
GLOBULIN SER CALC-MCNC: 2.2 GM/DL
GLUCOSE SERPL-MCNC: 87 MG/DL (ref 65–99)
HDLC SERPL-MCNC: 47 MG/DL (ref 40–60)
LDLC SERPL CALC-MCNC: 102 MG/DL (ref 0–100)
POTASSIUM SERPL-SCNC: 4.6 MMOL/L (ref 3.5–5.2)
PROT SERPL-MCNC: 6.7 G/DL (ref 6–8.5)
SODIUM SERPL-SCNC: 139 MMOL/L (ref 136–145)
T4 FREE SERPL-MCNC: 1.23 NG/DL (ref 0.93–1.7)
TRIGL SERPL-MCNC: 141 MG/DL (ref 0–150)
TSH SERPL DL<=0.005 MIU/L-ACNC: 3.42 UIU/ML (ref 0.27–4.2)
VLDLC SERPL CALC-MCNC: 25 MG/DL (ref 5–40)

## 2020-11-17 PROCEDURE — 99214 OFFICE O/P EST MOD 30 MIN: CPT | Performed by: INTERNAL MEDICINE

## 2020-11-17 NOTE — PROGRESS NOTES
"  Darek Oakley is a 65 y.o. male who presents with   Chief Complaint   Patient presents with   • Memory Loss     Short-term memory loss.  Noticeable over the past 2 weeks but could have been present before that.  Having trouble with names and finds himself \"searching for words\".   • Hyperlipidemia     Atorvastatin 40 mg   .    65-year-old male presents with concern for short-term memory loss.  He said he phone the office a few days ago to make an appointment but could not remember my name as his primary care physician even though he has been seeing me for about 12 years now he says.  Also he says he finds himself \"searching for words\".  He says his wife will occasionally have to finish sentences for him.  All of this is distinctly unusual for him he says.  He is an  and is used to dealing in specifics and mathematical calculations and finds these issues troubling.    Memory Loss  This is a new problem. The current episode started 1 to 4 weeks ago. The problem occurs intermittently. The problem has been unchanged. He has tried nothing for the symptoms.   Hyperlipidemia  This is a chronic problem. The current episode started more than 1 year ago. The problem is controlled. Recent lipid tests were reviewed and are normal. Current antihyperlipidemic treatment includes statins. The current treatment provides moderate improvement of lipids. Compliance problems include adherence to diet and adherence to exercise.         /60   Pulse 63   Temp 96.9 °F (36.1 °C) (Temporal)   Resp 13   Ht 185.4 cm (73\")   Wt 110 kg (242 lb 6.4 oz)   SpO2 99%   BMI 31.98 kg/m²     The following portions of the patient's history were reviewed and updated as appropriate: allergies, current medications, past medical history and problem list.    Review of Systems   Constitutional: Negative.    HENT: Negative.    Eyes: Negative.    Respiratory: Negative.    Cardiovascular: Negative.    Genitourinary: Negative.  "   Musculoskeletal: Negative.    Skin: Negative.    Neurological: Negative.    Psychiatric/Behavioral: Negative.         Short-term memory loss with concerns as outlined above       Objective   Physical Exam  Vitals signs and nursing note reviewed.   Constitutional:       General: He is not in acute distress.     Appearance: He is well-developed. He is not diaphoretic.   HENT:      Head: Normocephalic and atraumatic.   Eyes:      Pupils: Pupils are equal, round, and reactive to light.   Neck:      Musculoskeletal: Normal range of motion and neck supple.      Thyroid: No thyromegaly.      Comments: Neck exam negative.  Carotid auscultation normal-no bruits heard.    Cardiovascular:      Rate and Rhythm: Normal rate and regular rhythm.      Heart sounds: Normal heart sounds. No murmur. No friction rub. No gallop.    Pulmonary:      Effort: Pulmonary effort is normal. No respiratory distress.      Breath sounds: Normal breath sounds. No wheezing or rales.   Chest:      Chest wall: No tenderness.   Skin:     General: Skin is warm and dry.      Coloration: Skin is not pale.      Findings: No erythema.   Psychiatric:         Mood and Affect: Mood normal.         Behavior: Behavior normal.         Thought Content: Thought content normal.         Judgment: Judgment normal.      Comments: Patient is awake and alert.  Answers questions appropriately.  Mini mental status exam performed on today's visit.  Patient scored a perfect 30 points out of a possible 30 points.         Assessment/Plan   Diagnoses and all orders for this visit:    1. Short-term memory loss (Primary)  -     Comprehensive Metabolic Panel  -     TSH+Free T4  -     MRI Brain Without Contrast; Future    2. Mixed hyperlipidemia  -     Lipid Panel  -     TSH+Free T4      Plan: Labs as above for the physical issues as outlined.  We will get a brain MRI to look for any possible structural abnormalities referable to the patient's problem as described.    May want to  refer him to clinical psychology for more in-depth testing?

## 2020-12-11 ENCOUNTER — HOSPITAL ENCOUNTER (OUTPATIENT)
Dept: MRI IMAGING | Facility: HOSPITAL | Age: 65
Discharge: HOME | End: 2020-12-11
Admitting: INTERNAL MEDICINE

## 2020-12-11 DIAGNOSIS — R41.3 SHORT-TERM MEMORY LOSS: ICD-10-CM

## 2020-12-11 PROCEDURE — 70551 MRI BRAIN STEM W/O DYE: CPT

## 2020-12-17 DIAGNOSIS — R41.3 MEMORY LOSS: Primary | ICD-10-CM

## 2020-12-21 DIAGNOSIS — K21.9 GASTROESOPHAGEAL REFLUX DISEASE: ICD-10-CM

## 2020-12-21 RX ORDER — ATORVASTATIN CALCIUM 40 MG/1
40 TABLET, FILM COATED ORAL EVERY EVENING
Qty: 90 TABLET | Refills: 2 | Status: SHIPPED | OUTPATIENT
Start: 2020-12-21 | End: 2021-04-06 | Stop reason: SDUPTHER

## 2020-12-21 RX ORDER — OMEPRAZOLE 20 MG/1
20 CAPSULE, DELAYED RELEASE ORAL DAILY
Qty: 90 CAPSULE | Refills: 2 | Status: SHIPPED | OUTPATIENT
Start: 2020-12-21 | End: 2021-04-06 | Stop reason: SDUPTHER

## 2021-03-22 ENCOUNTER — BULK ORDERING (OUTPATIENT)
Dept: CASE MANAGEMENT | Facility: OTHER | Age: 66
End: 2021-03-22

## 2021-03-22 DIAGNOSIS — Z23 IMMUNIZATION DUE: ICD-10-CM

## 2021-04-04 NOTE — PROGRESS NOTES
"Chief Complaint  Establish Care    Subjective          Darek Oakley presents to [facility]  History of Present Illness     Prior PCP Ayush    HLD-stable.  Patient taking atorvastatin as prescribed.  Trying to adhere to a low-fat diet.  Last lipid panel in 11/2020 and was unremarkable.    GERD - stable.  Taking the omeprazole 20mg as prescribed.  Avoiding foods that trigger acid production.    ED - stable.  Takes sildenafil 50mg PRN for ED.  No side effects.    Objective   Vital Signs:   /72 (BP Location: Left arm, Patient Position: Sitting, Cuff Size: Adult)   Pulse 72   Temp 96.9 °F (36.1 °C) (Temporal)   Resp 16   Ht 185.4 cm (73\")   Wt 110 kg (242 lb 3.2 oz)   SpO2 98%   BMI 31.95 kg/m²     Physical Exam  Vitals and nursing note reviewed.   Constitutional:       General: He is not in acute distress.     Appearance: Normal appearance.   Cardiovascular:      Rate and Rhythm: Normal rate and regular rhythm. Occasional extrasystoles are present.     Heart sounds: Normal heart sounds.   Pulmonary:      Effort: Pulmonary effort is normal.      Breath sounds: Normal breath sounds.   Neurological:      Mental Status: He is alert.        Result Review :   The following data was reviewed by: Kyle Wallace MD on 04/06/2021:  Common labs    Common Labsle 11/17/20 11/17/20    1200 1200   Glucose 87    BUN 14    Creatinine 1.25    eGFR Non  Am 58 (A)    eGFR African Am 70    Sodium 139    Potassium 4.6    Chloride 101    Calcium 9.3    Total Protein 6.7    Albumin 4.50    Total Bilirubin 0.5    Alkaline Phosphatase 95    AST (SGOT) 28    ALT (SGPT) 36    Total Cholesterol  174   Triglycerides  141   HDL Cholesterol  47   LDL Cholesterol   102 (A)   (A) Abnormal value                      Assessment and Plan    Diagnoses and all orders for this visit:    1. Encounter to establish care with new doctor (Primary)    2. Mixed hyperlipidemia  -     atorvastatin (LIPITOR) 40 MG " tablet; Take 1 tablet by mouth Every Evening.  Dispense: 90 tablet; Refill: 3  -     Comprehensive Metabolic Panel    3. Gastroesophageal reflux disease, unspecified whether esophagitis present  -     omeprazole (priLOSEC) 20 MG capsule; Take 1 capsule by mouth Daily.  Dispense: 90 capsule; Refill: 3    4. ED (erectile dysfunction) of organic origin  -     sildenafil (VIAGRA) 50 MG tablet; Take 1 tablet by mouth As Needed for Erectile Dysfunction.  Dispense: 4 tablet; Refill: 3    5. Gastroesophageal reflux disease  -     omeprazole (priLOSEC) 20 MG capsule; Take 1 capsule by mouth Daily.  Dispense: 90 capsule; Refill: 3      Plan as above.  Ordered his meds and ordered his labs.  Fasting with little water intake this morning.      Follow Up   Return in about 6 months (around 10/6/2021).  Patient was given instructions and counseling regarding his condition or for health maintenance advice. Please see specific information pulled into the AVS if appropriate.

## 2021-04-06 ENCOUNTER — OFFICE VISIT (OUTPATIENT)
Dept: INTERNAL MEDICINE | Facility: CLINIC | Age: 66
End: 2021-04-06

## 2021-04-06 VITALS
DIASTOLIC BLOOD PRESSURE: 72 MMHG | SYSTOLIC BLOOD PRESSURE: 135 MMHG | BODY MASS INDEX: 32.1 KG/M2 | HEART RATE: 72 BPM | TEMPERATURE: 96.9 F | HEIGHT: 73 IN | OXYGEN SATURATION: 98 % | RESPIRATION RATE: 16 BRPM | WEIGHT: 242.2 LBS

## 2021-04-06 DIAGNOSIS — E78.2 MIXED HYPERLIPIDEMIA: ICD-10-CM

## 2021-04-06 DIAGNOSIS — K21.9 GASTROESOPHAGEAL REFLUX DISEASE, UNSPECIFIED WHETHER ESOPHAGITIS PRESENT: ICD-10-CM

## 2021-04-06 DIAGNOSIS — N52.9 ED (ERECTILE DYSFUNCTION) OF ORGANIC ORIGIN: ICD-10-CM

## 2021-04-06 DIAGNOSIS — Z76.89 ENCOUNTER TO ESTABLISH CARE WITH NEW DOCTOR: Primary | ICD-10-CM

## 2021-04-06 DIAGNOSIS — K21.9 GASTROESOPHAGEAL REFLUX DISEASE: ICD-10-CM

## 2021-04-06 PROCEDURE — 99214 OFFICE O/P EST MOD 30 MIN: CPT | Performed by: FAMILY MEDICINE

## 2021-04-06 RX ORDER — SILDENAFIL 50 MG/1
50 TABLET, FILM COATED ORAL AS NEEDED
Qty: 4 TABLET | Refills: 3
Start: 2021-04-06

## 2021-04-06 RX ORDER — OMEPRAZOLE 20 MG/1
20 CAPSULE, DELAYED RELEASE ORAL DAILY
Qty: 90 CAPSULE | Refills: 3 | Status: SHIPPED | OUTPATIENT
Start: 2021-04-06 | End: 2022-06-08

## 2021-04-06 RX ORDER — ATORVASTATIN CALCIUM 40 MG/1
40 TABLET, FILM COATED ORAL EVERY EVENING
Qty: 90 TABLET | Refills: 3 | Status: SHIPPED | OUTPATIENT
Start: 2021-04-06 | End: 2022-01-17 | Stop reason: SDUPTHER

## 2021-04-06 NOTE — PATIENT INSTRUCTIONS
Food Choices for Gastroesophageal Reflux Disease, Adult  When you have gastroesophageal reflux disease (GERD), the foods you eat and your eating habits are very important. Choosing the right foods can help ease your discomfort. Think about working with a food expert (dietitian) to help you make good choices.  What are tips for following this plan?  Reading food labels  · Read the label for foods that are low in saturated fat. Foods that may help with your symptoms include:  ? Foods that have less than 5% of daily value (DV) of fat.  ? Foods that have 0 grams of trans fat.  Cooking  · Do not bagley your food. Cook your food by baking, steaming, grilling, or broiling. These are all methods that do not need a lot of fat for cooking.  · To add flavor, try to use herbs that are low in spice and acidity.  Meal planning    · Choose healthy foods that are low in fat, such as:  ? Fruits and vegetables.  ? Whole grains.  ? Low-fat dairy products.  ? Lean meats, fish, and poultry.  · Eat small meals often instead of eating 3 large meals each day. Eat your meals slowly in a place where you are relaxed. Avoid bending over or lying down until 2-3 hours after eating.  · Limit high-fat foods such as fatty meats or fried foods.  · Limit your intake of oils, butter, and shortening to less than 8 teaspoons each day.  · Avoid the following:  ? Foods that cause symptoms. These may be different for different people. Keep a food diary to keep track of foods that cause symptoms.  ? Alcohol.  ? Drinking a lot of liquid with meals.  ? Eating meals during the 2-3 hours before bed.  Lifestyle  · Stay at a healthy weight. Ask your doctor what weight is healthy for you. If you need to lose weight, work with your doctor to do so safely.  · Exercise for at least 30 minutes on 5 or more days each week, or as told by your doctor.  · Wear loose-fitting clothes.  · Do not use any products that contain nicotine or tobacco, such as cigarettes,  e-cigarettes, and chewing tobacco. If you need help quitting, ask your doctor.  · Sleep with the head of your bed higher than your feet. Use a wedge under the mattress or blocks under the bed frame to raise the head of the bed.  What foods should eat?    Eat a healthy, well-balanced diet of fruits, vegetables, whole grains, low-fat dairy products, lean meats, fish, and poultry. Each person is different. Foods that may cause symptoms in one person may not cause any symptoms in another person. Work with your doctor to find foods that are safe for you.  The items listed above may not be a complete list of foods and beverages you can eat. Contact a dietitian for more information.  What foods should I avoid?  Limiting some of these foods may help in managing the symptoms of GERD. Everyone is different. Talk with a food expert or your doctor to help you find the exact foods to avoid, if any.  Fruits  Any fruits prepared with added fat. Any fruits that cause symptoms. For some people, this may include citrus fruits, such as oranges, grapefruit, pineapple, and valentine.  Vegetables  Deep-fried vegetables. French fries. Any vegetables prepared with added fat. Any vegetables that cause symptoms. For some people, this may include tomatoes and tomato products, chili peppers, onions and garlic, and horseradish.  Grains  Pastries or quick breads with added fat.  Meats and other proteins  High-fat meats, such as fatty beef or pork, hot dogs, ribs, ham, sausage, salami, and casas. Fried meat or protein, including fried fish and fried chicken. Nuts and nut butters.  Dairy  Whole milk and chocolate milk. Sour cream. Cream. Ice cream. Cream cheese. Milkshakes.  Fats and oils  Butter. Margarine. Shortening. Ghee.  Beverages  Coffee and tea, with or without caffeine. Carbonated beverages. Sodas. Energy drinks. Fruit juice made with acidic fruits (such as orange or grapefruit). Tomato juice. Alcoholic drinks.  Sweets and  desserts  Chocolate and cocoa. Donuts.  Seasonings and condiments  Pepper. Peppermint and spearmint. Added salt. Any condiments, herbs, or seasonings that cause symptoms. For some people, this may include mcmullen, hot sauce, or vinegar-based salad dressings.  The items listed above may not be a complete list of foods and beverages you should avoid. Contact a dietitian for more information.  Questions to ask your doctor  Diet and lifestyle changes are often the first steps that are taken to manage symptoms of GERD. If diet and lifestyle changes do not help, talk with your doctor about taking medicines.  Where to find more information  · [employer]: aboutgerd.org  Summary  · When you have GERD, food and lifestyle choices are very important in easing your symptoms.  · Eat small meals often instead of 3 large meals a day. Eat your meals slowly and in a place where you are relaxed.  · Avoid bending over or lying down until 2-3 hours after eating.  · Limit high-fat foods such as fatty meat or fried foods.  This information is not intended to replace advice given to you by your health care provider. Make sure you discuss any questions you have with your health care provider.  Document Revised: 10/12/2020 Document Reviewed: 10/12/2020  Elsevier Patient Education © 2021 [employer]

## 2021-04-07 DIAGNOSIS — N17.9 AKI (ACUTE KIDNEY INJURY) (HCC): Primary | ICD-10-CM

## 2021-04-07 LAB
ALBUMIN SERPL-MCNC: 4.5 G/DL (ref 3.8–4.8)
ALBUMIN/GLOB SERPL: 1.8 {RATIO} (ref 1.2–2.2)
ALP SERPL-CCNC: 97 IU/L (ref 39–117)
ALT SERPL-CCNC: 37 IU/L (ref 0–44)
AST SERPL-CCNC: 34 IU/L (ref 0–40)
BILIRUB SERPL-MCNC: 0.5 MG/DL (ref 0–1.2)
BUN SERPL-MCNC: 19 MG/DL (ref 8–27)
BUN/CREAT SERPL: 13 (ref 10–24)
CALCIUM SERPL-MCNC: 9.7 MG/DL (ref 8.6–10.2)
CHLORIDE SERPL-SCNC: 103 MMOL/L (ref 96–106)
CO2 SERPL-SCNC: 22 MMOL/L (ref 20–29)
CREAT SERPL-MCNC: 1.5 MG/DL (ref 0.76–1.27)
GLOBULIN SER CALC-MCNC: 2.5 G/DL (ref 1.5–4.5)
GLUCOSE SERPL-MCNC: 103 MG/DL (ref 65–99)
POTASSIUM SERPL-SCNC: 4.7 MMOL/L (ref 3.5–5.2)
PROT SERPL-MCNC: 7 G/DL (ref 6–8.5)
SODIUM SERPL-SCNC: 139 MMOL/L (ref 134–144)

## 2021-04-13 PROBLEM — N18.31 STAGE 3A CHRONIC KIDNEY DISEASE: Chronic | Status: ACTIVE | Noted: 2021-04-13

## 2021-04-30 ENCOUNTER — OFFICE VISIT (OUTPATIENT)
Dept: INTERNAL MEDICINE | Facility: CLINIC | Age: 66
End: 2021-04-30

## 2021-04-30 VITALS
OXYGEN SATURATION: 98 % | DIASTOLIC BLOOD PRESSURE: 81 MMHG | RESPIRATION RATE: 18 BRPM | SYSTOLIC BLOOD PRESSURE: 134 MMHG | WEIGHT: 252 LBS | BODY MASS INDEX: 33.4 KG/M2 | HEIGHT: 73 IN | TEMPERATURE: 98.2 F | HEART RATE: 60 BPM

## 2021-04-30 DIAGNOSIS — R00.2 PALPITATIONS: Primary | ICD-10-CM

## 2021-04-30 PROCEDURE — 99214 OFFICE O/P EST MOD 30 MIN: CPT | Performed by: NURSE PRACTITIONER

## 2021-04-30 PROCEDURE — 93000 ELECTROCARDIOGRAM COMPLETE: CPT | Performed by: NURSE PRACTITIONER

## 2021-04-30 NOTE — PROGRESS NOTES
"Chief Complaint  Abnormal ECG (Pt presents here today for a follow up on and EKG single lead.)    Subjective          Darek Oakley presents to [facility]  Patient presents for evaluation of palpitations.  This is a 65-year-old male patient of Dr. Wallace with CKD stage III, GERD, hyperlipidemia.  He ran a mini marathon 5 days ago.  Reports that the next day he was feeling \"off\" and feeling some palpitations.  He took an EKG with his [employer] Mobile EKG device which showed \"possible A. fib.\"  He is here to follow-up on this.  He has a history of frequent PVCs.  Reports that this was diagnosed about 20 years ago.  These happen quite infrequently, but when they do they cause him to feel palpitations for a few minutes at a time.  He reports that this particular episode felt like an episode of his PVCs.  Denies any concurrent shortness of breath, chest discomfort, fever or chills.  He feels well today.  Tolerating exercise well.  Denies development of any other new issues today.      Objective   Vital Signs:   /81 (BP Location: Right arm, Patient Position: Sitting, Cuff Size: Large Adult)   Pulse 60   Temp 98.2 °F (36.8 °C)   Resp 18   Ht 185.4 cm (73\")   Wt 114 kg (252 lb)   SpO2 98%   BMI 33.25 kg/m²     Physical Exam  Vitals and nursing note reviewed.   Constitutional:       General: He is not in acute distress.     Appearance: Normal appearance. He is well-developed. He is not ill-appearing, toxic-appearing or diaphoretic.   HENT:      Head: Normocephalic and atraumatic.      Right Ear: External ear normal.      Left Ear: External ear normal.   Eyes:      Pupils: Pupils are equal, round, and reactive to light.   Neck:      Vascular: No carotid bruit.   Cardiovascular:      Rate and Rhythm: Normal rate and regular rhythm.      Pulses: Normal pulses.      Heart sounds: Normal heart sounds.      Comments: No peripheral edema  Pulmonary:      Effort: Pulmonary effort is normal. No " respiratory distress.      Breath sounds: Normal breath sounds. No stridor. No wheezing, rhonchi or rales.   Chest:      Chest wall: No tenderness.   Abdominal:      General: Bowel sounds are normal. There is no distension.      Palpations: Abdomen is soft. There is no mass.      Tenderness: There is no abdominal tenderness. There is no right CVA tenderness, left CVA tenderness, guarding or rebound.      Hernia: No hernia is present.   Musculoskeletal:         General: Normal range of motion.      Cervical back: Normal range of motion and neck supple. No rigidity or tenderness.   Lymphadenopathy:      Cervical: No cervical adenopathy.   Skin:     General: Skin is warm and dry.      Capillary Refill: Capillary refill takes less than 2 seconds.   Neurological:      General: No focal deficit present.      Mental Status: He is alert and oriented to person, place, and time. Mental status is at baseline.   Psychiatric:         Mood and Affect: Mood normal.         Behavior: Behavior normal.         Thought Content: Thought content normal.         Judgment: Judgment normal.        Result Review :   The following data was reviewed by: ELA Velasquez on 04/30/2021:  Common labs    Common Labsle 11/17/20 11/17/20 4/6/21 4/12/21    1200 1200     Glucose 87  103 (A) 97   BUN 14  19 13   Creatinine 1.25  1.50 (A) 1.29 (A)   eGFR Non African Am 58 (A)  49 (A) 56 (A)   eGFR  Am 70  56 (A) 68   Sodium 139  139 140   Potassium 4.6  4.7 4.4   Chloride 101  103 105   Calcium 9.3  9.7 9.6   Total Protein 6.7  7.0    Albumin 4.50  4.5    Total Bilirubin 0.5  0.5    Alkaline Phosphatase 95  97    AST (SGOT) 28  34    ALT (SGPT) 36  37    Total Cholesterol  174     Triglycerides  141     HDL Cholesterol  47     LDL Cholesterol   102 (A)     (A) Abnormal value       Comments are available for some flowsheets but are not being displayed.           Current outpatient and discharge medications have been reconciled for the  patient.  Reviewed by: ELA Velasquez      ECG 12 Lead    Date/Time: 4/30/2021 3:49 PM  Performed by: Charo Downs APRN  Authorized by: Charo Downs APRN   Comparison: not compared with previous ECG   Previous ECG: no previous ECG available  Rhythm: sinus rhythm  Rate: normal  Conduction: conduction normal  ST Segments: ST segments normal  T Waves: T waves normal  QRS axis: normal  Other: no other findings    Clinical impression: normal ECG              Assessment and Plan    Diagnoses and all orders for this visit:    1. Palpitations (Primary)  -     Holter Monitor - 48 Hour; Future  -     ECG 12 Lead    Other orders  -     Cancel: TSH Rfx On Abnormal To Free T4      EKG in the office today showing normal sinus rhythm.  I will order a Holter monitor.  Discussed getting thyroid studies today but he would like to hold off on any further labs as he has had multiple lab draws over this past month.  He has an appointment with Dr. Wallace in July 2021 and will keep this appointment for labs and follow-up.  He knows that for any development of shortness of breath or chest discomfort he is to proceed to the emergency department.  It is possible that this was an episode of PVCs of which he does have a history.    We will contact patient with Holter monitor results and any further recommendations.  Follow-up as needed and at next scheduled office visit with PCP, Dr. Wallace.    Follow Up   Return if symptoms worsen or fail to improve, for Next scheduled follow up.  Patient was given instructions and counseling regarding his condition or for health maintenance advice. Please see specific information pulled into the AVS if appropriate.       Answers for HPI/ROS submitted by the patient on 4/28/2021  What is the primary reason for your visit?: Other  Please describe your symptoms.: EKG result.  Have you had these symptoms before?: Yes  How long have you been having these symptoms?: 1-4 days  Please list any  medications you are currently taking for this condition.: None.  Please describe any probable cause for these symptoms. : I have a history of premature ventricular contractions. It has never been diagnosed as serious.

## 2021-05-18 ENCOUNTER — HOSPITAL ENCOUNTER (OUTPATIENT)
Dept: CARDIOLOGY | Facility: HOSPITAL | Age: 66
Discharge: HOME | End: 2021-05-18
Admitting: NURSE PRACTITIONER

## 2021-05-18 DIAGNOSIS — R00.2 PALPITATIONS: ICD-10-CM

## 2021-05-18 PROCEDURE — 93225 XTRNL ECG REC<48 HRS REC: CPT

## 2021-05-18 PROCEDURE — 93226 XTRNL ECG REC<48 HR SCAN A/R: CPT

## 2021-05-22 LAB
MAXIMAL PREDICTED HEART RATE: 156 BPM
STRESS TARGET HR: 133 BPM

## 2021-05-22 PROCEDURE — 93227 XTRNL ECG REC<48 HR R&I: CPT | Performed by: INTERNAL MEDICINE

## 2021-07-01 ENCOUNTER — OFFICE VISIT (OUTPATIENT)
Dept: INTERNAL MEDICINE | Facility: CLINIC | Age: 66
End: 2021-07-01

## 2021-07-01 VITALS
RESPIRATION RATE: 16 BRPM | BODY MASS INDEX: 34.27 KG/M2 | WEIGHT: 258.6 LBS | HEIGHT: 73 IN | OXYGEN SATURATION: 98 % | SYSTOLIC BLOOD PRESSURE: 124 MMHG | DIASTOLIC BLOOD PRESSURE: 72 MMHG | HEART RATE: 62 BPM | TEMPERATURE: 97.8 F

## 2021-07-01 DIAGNOSIS — N18.31 STAGE 3A CHRONIC KIDNEY DISEASE (HCC): ICD-10-CM

## 2021-07-01 DIAGNOSIS — E78.2 MIXED HYPERLIPIDEMIA: ICD-10-CM

## 2021-07-01 DIAGNOSIS — R00.2 PALPITATIONS: Primary | ICD-10-CM

## 2021-07-01 PROCEDURE — 99214 OFFICE O/P EST MOD 30 MIN: CPT | Performed by: FAMILY MEDICINE

## 2021-07-01 NOTE — PROGRESS NOTES
"Chief Complaint  palpations (3 month follow up ) and Hyperlipidemia    Subjective          Darek Oakley presents to [facility]  History of Present Illness     Palpitations -he had some palpitations and there was some concern of a-fib.  EKG from 4/30/21 was reviewed by me and showed sinus jeff with no PVCs or A-fib.  Holter showed some PVCs but also no Afib.  Sometimes will get HR around high 170 to low 180s going up a hill.  It will decrease after he stops running the hill.    HLD-stable.  Patient taking atorvastatin 40mg as prescribed.  Trying to adhere to a low-fat diet.      Objective   Vital Signs:   /72 (BP Location: Left arm, Patient Position: Sitting, Cuff Size: Adult)   Pulse 62   Temp 97.8 °F (36.6 °C) (Temporal)   Resp 16   Ht 185.4 cm (73\")   Wt 117 kg (258 lb 9.6 oz)   SpO2 98%   BMI 34.12 kg/m²     Physical Exam  Vitals and nursing note reviewed.   Constitutional:       General: He is not in acute distress.     Appearance: Normal appearance.   Cardiovascular:      Rate and Rhythm: Normal rate and regular rhythm.      Heart sounds: Normal heart sounds. No murmur heard.     Pulmonary:      Effort: Pulmonary effort is normal.      Breath sounds: Normal breath sounds.   Neurological:      Mental Status: He is alert.        Result Review :   The following data was reviewed by: Kyle Wallace MD on 07/01/2021:  Common labs    Common Labsle 11/17/20 11/17/20 4/6/21 4/12/21    1200 1200     Glucose 87  103 (A) 97   BUN 14  19 13   Creatinine 1.25  1.50 (A) 1.29 (A)   eGFR Non African Am 58 (A)  49 (A) 56 (A)   eGFR  Am 70  56 (A) 68   Sodium 139  139 140   Potassium 4.6  4.7 4.4   Chloride 101  103 105   Calcium 9.3  9.7 9.6   Total Protein 6.7  7.0    Albumin 4.50  4.5    Total Bilirubin 0.5  0.5    Alkaline Phosphatase 95  97    AST (SGOT) 28  34    ALT (SGPT) 36  37    Total Cholesterol  174     Triglycerides  141     HDL Cholesterol  47     LDL " Cholesterol   102 (A)     (A) Abnormal value       Comments are available for some flowsheets but are not being displayed.                     Assessment and Plan    Diagnoses and all orders for this visit:    1. Palpitations (Primary)  -     CBC (No Diff)  -     Comprehensive Metabolic Panel    2. Mixed hyperlipidemia  -     CBC (No Diff)  -     Comprehensive Metabolic Panel  -     Lipid Panel    3. Stage 3a chronic kidney disease (CMS/HCC)  -     CBC (No Diff)  -     Comprehensive Metabolic Panel  -     Lipid Panel      Palpitations appear stable and I reviewed his EKG from April which only showed some sinus bradycardia.  He had his averages from his Holter monitor and it looks like his heart rate never got above 150 on average but did have some elevations into the 170s.  I think we will continue to monitor for now.  We will get some routine labs also for his hyperlipidemia and make sure his stage IIIa chronic kidney disease is okay.  Continue atorvastatin as above.      Follow Up   Return in about 6 months (around 1/10/2022) for Annual physical.  Patient was given instructions and counseling regarding his condition or for health maintenance advice. Please see specific information pulled into the AVS if appropriate.

## 2021-07-01 NOTE — PATIENT INSTRUCTIONS
Palpitations  Palpitations are feelings that your heartbeat is not normal. Your heartbeat may feel like it is:  · Uneven.  · Faster than normal.  · Fluttering.  · Skipping a beat.  This is usually not a serious problem. In some cases, you may need tests to rule out any serious problems.  Follow these instructions at home:  Pay attention to any changes in your condition. Take these actions to help manage your symptoms:  Eating and drinking  · Avoid:  ? Coffee, tea, soft drinks, and energy drinks.  ? Chocolate.  ? Alcohol.  ? Diet pills.  Lifestyle    · Try to lower your stress. These things can help you relax:  ? Yoga.  ? Deep breathing and meditation.  ? Exercise.  ? Using words and images to create positive thoughts (guided imagery).  ? Using your mind to control things in your body (biofeedback).  · Do not use drugs.  · Get plenty of rest and sleep. Keep a regular bed time.  General instructions    · Take over-the-counter and prescription medicines only as told by your doctor.  · Do not use any products that contain nicotine or tobacco, such as cigarettes and e-cigarettes. If you need help quitting, ask your doctor.  · Keep all follow-up visits as told by your doctor. This is important. You may need more tests if palpitations do not go away or get worse.  Contact a doctor if:  · Your symptoms last more than 24 hours.  · Your symptoms occur more often.  Get help right away if you:  · Have chest pain.  · Feel short of breath.  · Have a very bad headache.  · Feel dizzy.  · Pass out (faint).  Summary  · Palpitations are feelings that your heartbeat is uneven or faster than normal. It may feel like your heart is fluttering or skipping a beat.  · Avoid food and drinks that may cause palpitations. These include caffeine, chocolate, and alcohol.  · Try to lower your stress. Do not smoke or use drugs.  · Get help right away if you faint or have chest pain, shortness of breath, a severe headache, or dizziness.  This  information is not intended to replace advice given to you by your health care provider. Make sure you discuss any questions you have with your health care provider.  Document Revised: 01/30/2019 Document Reviewed: 01/30/2019  Elsevier Patient Education © 2021 [employer]

## 2021-07-02 LAB
ALBUMIN SERPL-MCNC: 4.6 G/DL (ref 3.5–5.2)
ALBUMIN/GLOB SERPL: 1.9 G/DL
ALP SERPL-CCNC: 91 U/L (ref 39–117)
ALT SERPL-CCNC: 35 U/L (ref 1–41)
AST SERPL-CCNC: 28 U/L (ref 1–40)
BILIRUB SERPL-MCNC: 0.4 MG/DL (ref 0–1.2)
BUN SERPL-MCNC: 13 MG/DL (ref 8–23)
BUN/CREAT SERPL: 10.2 (ref 7–25)
CALCIUM SERPL-MCNC: 9.7 MG/DL (ref 8.6–10.5)
CHLORIDE SERPL-SCNC: 102 MMOL/L (ref 98–107)
CHOLEST SERPL-MCNC: 197 MG/DL (ref 0–200)
CO2 SERPL-SCNC: 26.9 MMOL/L (ref 22–29)
CREAT SERPL-MCNC: 1.27 MG/DL (ref 0.76–1.27)
ERYTHROCYTE [DISTWIDTH] IN BLOOD BY AUTOMATED COUNT: 12.9 % (ref 12.3–15.4)
GLOBULIN SER CALC-MCNC: 2.4 GM/DL
GLUCOSE SERPL-MCNC: 93 MG/DL (ref 65–99)
HCT VFR BLD AUTO: 45.7 % (ref 37.5–51)
HDLC SERPL-MCNC: 43 MG/DL (ref 40–60)
HGB BLD-MCNC: 14.9 G/DL (ref 13–17.7)
LDLC SERPL CALC-MCNC: 122 MG/DL (ref 0–100)
MCH RBC QN AUTO: 29.7 PG (ref 26.6–33)
MCHC RBC AUTO-ENTMCNC: 32.6 G/DL (ref 31.5–35.7)
MCV RBC AUTO: 91.2 FL (ref 79–97)
PLATELET # BLD AUTO: 262 10*3/MM3 (ref 140–450)
POTASSIUM SERPL-SCNC: 4.3 MMOL/L (ref 3.5–5.2)
PROT SERPL-MCNC: 7 G/DL (ref 6–8.5)
RBC # BLD AUTO: 5.01 10*6/MM3 (ref 4.14–5.8)
SODIUM SERPL-SCNC: 139 MMOL/L (ref 136–145)
TRIGL SERPL-MCNC: 178 MG/DL (ref 0–150)
VLDLC SERPL CALC-MCNC: 32 MG/DL (ref 5–40)
WBC # BLD AUTO: 6.86 10*3/MM3 (ref 3.4–10.8)

## 2022-01-17 ENCOUNTER — OFFICE VISIT (OUTPATIENT)
Dept: INTERNAL MEDICINE | Facility: CLINIC | Age: 67
End: 2022-01-17

## 2022-01-17 VITALS
DIASTOLIC BLOOD PRESSURE: 80 MMHG | BODY MASS INDEX: 33.8 KG/M2 | TEMPERATURE: 97.4 F | HEIGHT: 73 IN | SYSTOLIC BLOOD PRESSURE: 124 MMHG | WEIGHT: 255 LBS | HEART RATE: 69 BPM | OXYGEN SATURATION: 98 %

## 2022-01-17 DIAGNOSIS — Z00.00 ENCOUNTER FOR HEALTH MAINTENANCE EXAMINATION IN ADULT: Primary | ICD-10-CM

## 2022-01-17 DIAGNOSIS — E78.2 MIXED HYPERLIPIDEMIA: ICD-10-CM

## 2022-01-17 DIAGNOSIS — N18.31 STAGE 3A CHRONIC KIDNEY DISEASE: ICD-10-CM

## 2022-01-17 PROCEDURE — 99397 PER PM REEVAL EST PAT 65+ YR: CPT | Performed by: FAMILY MEDICINE

## 2022-01-17 RX ORDER — ALFUZOSIN HYDROCHLORIDE 10 MG/1
10 TABLET, EXTENDED RELEASE ORAL DAILY
COMMUNITY

## 2022-01-17 RX ORDER — ATORVASTATIN CALCIUM 40 MG/1
40 TABLET, FILM COATED ORAL EVERY EVENING
Qty: 90 TABLET | Refills: 3 | Status: SHIPPED | OUTPATIENT
Start: 2022-01-17 | End: 2022-06-01

## 2022-01-17 NOTE — PROGRESS NOTES
"Chief Complaint  Annual Exam and Hyperlipidemia    Subjective            Darek Oakley presents to [facility]  History of Present Illness    CPE- Patient reporting that health is doing well overall.  Patient is here today for annual physical.  Eating a balanced diet.    Labs: Due for routine labs  Due for vaccines: Encouraged    Colorectal cancer screening: UTD    Copy requested for Living Will      Review of Systems   Constitutional: Negative for fatigue and fever.   Respiratory: Negative for shortness of breath and wheezing.    Cardiovascular: Negative for chest pain.         Objective   Vital Signs:   /80 (BP Location: Left arm, Patient Position: Sitting, Cuff Size: Adult)   Pulse 69   Temp 97.4 °F (36.3 °C) (Temporal)   Ht 185.4 cm (73\")   Wt 116 kg (255 lb)   SpO2 98%   BMI 33.64 kg/m²     Physical Exam  Vitals and nursing note reviewed.   Constitutional:       General: He is not in acute distress.     Appearance: Normal appearance.   HENT:      Right Ear: Tympanic membrane, ear canal and external ear normal.      Left Ear: Tympanic membrane, ear canal and external ear normal.      Nose: Nose normal.      Mouth/Throat:      Mouth: Mucous membranes are moist.   Eyes:      General:         Right eye: No discharge.         Left eye: No discharge.      Conjunctiva/sclera: Conjunctivae normal.   Cardiovascular:      Rate and Rhythm: Normal rate and regular rhythm.      Pulses: Normal pulses.      Heart sounds: Normal heart sounds.   Pulmonary:      Effort: Pulmonary effort is normal.      Breath sounds: Normal breath sounds.   Abdominal:      General: Bowel sounds are normal. There is no distension.      Palpations: Abdomen is soft.      Tenderness: There is no abdominal tenderness.   Musculoskeletal:      Cervical back: Neck supple.      Right lower leg: No edema.      Left lower leg: No edema.   Lymphadenopathy:      Cervical: No cervical adenopathy.   Skin:     General: " Skin is warm.      Findings: No rash.   Neurological:      General: No focal deficit present.      Mental Status: He is alert. Mental status is at baseline.   Psychiatric:         Mood and Affect: Mood normal.         Behavior: Behavior normal.        Result Review :   The following data was reviewed by: Kyle Wallace MD on 01/17/2022:  Common labs    Common Labsle 4/6/21 4/12/21 7/1/21 7/1/21 7/1/21      1127 1127 1127   Glucose 103 (A) 97  93    BUN 19 13  13    Creatinine 1.50 (A) 1.29 (A)  1.27    eGFR Non African Am 49 (A) 56 (A)  57 (A)    eGFR  Am 56 (A) 68  69    Sodium 139 140  139    Potassium 4.7 4.4  4.3    Chloride 103 105  102    Calcium 9.7 9.6  9.7    Total Protein 7.0   7.0    Albumin 4.5   4.60    Total Bilirubin 0.5   0.4    Alkaline Phosphatase 97   91    AST (SGOT) 34   28    ALT (SGPT) 37   35    WBC   6.86     Hemoglobin   14.9     Hematocrit   45.7     Platelets   262     Total Cholesterol     197   Triglycerides     178 (A)   HDL Cholesterol     43   LDL Cholesterol      122 (A)   (A) Abnormal value       Comments are available for some flowsheets but are not being displayed.                     Assessment and Plan    Diagnoses and all orders for this visit:    1. Encounter for health maintenance examination in adult (Primary)  -     CBC (No Diff)  -     Comprehensive Metabolic Panel  -     Lipid Panel With LDL / HDL Ratio    2. Mixed hyperlipidemia  -     CBC (No Diff)  -     Comprehensive Metabolic Panel  -     Lipid Panel With LDL / HDL Ratio  -     atorvastatin (LIPITOR) 40 MG tablet; Take 1 tablet by mouth Every Evening.  Dispense: 90 tablet; Refill: 3    3. Stage 3a chronic kidney disease (HCC)  -     CBC (No Diff)  -     Comprehensive Metabolic Panel          The patient was counseled regarding nutrition, physical activity, healthy weight, injury prevention, misuse of tobacco, alcohol and illicit drugs, mental health, immunizations, and screenings.      Follow Up   Return in  about 26 weeks (around 7/18/2022) for Recheck - HLD, CKD.  Patient was given instructions and counseling regarding his condition or for health maintenance advice. Please see specific information pulled into the AVS if appropriate.

## 2022-01-18 PROBLEM — N18.31 STAGE 3A CHRONIC KIDNEY DISEASE: Chronic | Status: RESOLVED | Noted: 2021-04-13 | Resolved: 2022-01-18

## 2022-01-18 LAB
ALBUMIN SERPL-MCNC: 4.5 G/DL (ref 3.8–4.8)
ALBUMIN/GLOB SERPL: 2 {RATIO} (ref 1.2–2.2)
ALP SERPL-CCNC: 96 IU/L (ref 44–121)
ALT SERPL-CCNC: 38 IU/L (ref 0–44)
AST SERPL-CCNC: 31 IU/L (ref 0–40)
BILIRUB SERPL-MCNC: 0.3 MG/DL (ref 0–1.2)
BUN SERPL-MCNC: 15 MG/DL (ref 8–27)
BUN/CREAT SERPL: 12 (ref 10–24)
CALCIUM SERPL-MCNC: 9.8 MG/DL (ref 8.6–10.2)
CHLORIDE SERPL-SCNC: 105 MMOL/L (ref 96–106)
CHOLEST SERPL-MCNC: 179 MG/DL (ref 100–199)
CO2 SERPL-SCNC: 21 MMOL/L (ref 20–29)
CREAT SERPL-MCNC: 1.25 MG/DL (ref 0.76–1.27)
ERYTHROCYTE [DISTWIDTH] IN BLOOD BY AUTOMATED COUNT: 12.6 % (ref 11.6–15.4)
GLOBULIN SER CALC-MCNC: 2.3 G/DL (ref 1.5–4.5)
GLUCOSE SERPL-MCNC: 97 MG/DL (ref 65–99)
HCT VFR BLD AUTO: 44.5 % (ref 37.5–51)
HDLC SERPL-MCNC: 43 MG/DL
HGB BLD-MCNC: 14.4 G/DL (ref 13–17.7)
LDLC SERPL CALC-MCNC: 108 MG/DL (ref 0–99)
LDLC/HDLC SERPL: 2.5 RATIO (ref 0–3.6)
MCH RBC QN AUTO: 29 PG (ref 26.6–33)
MCHC RBC AUTO-ENTMCNC: 32.4 G/DL (ref 31.5–35.7)
MCV RBC AUTO: 90 FL (ref 79–97)
PLATELET # BLD AUTO: 247 X10E3/UL (ref 150–450)
POTASSIUM SERPL-SCNC: 4.5 MMOL/L (ref 3.5–5.2)
PROT SERPL-MCNC: 6.8 G/DL (ref 6–8.5)
RBC # BLD AUTO: 4.97 X10E6/UL (ref 4.14–5.8)
SODIUM SERPL-SCNC: 140 MMOL/L (ref 134–144)
TRIGL SERPL-MCNC: 161 MG/DL (ref 0–149)
VLDLC SERPL CALC-MCNC: 28 MG/DL (ref 5–40)
WBC # BLD AUTO: 6.7 X10E3/UL (ref 3.4–10.8)

## 2022-02-17 ENCOUNTER — OFFICE VISIT (OUTPATIENT)
Dept: INTERNAL MEDICINE | Facility: CLINIC | Age: 67
End: 2022-02-17

## 2022-02-17 ENCOUNTER — HOSPITAL ENCOUNTER (EMERGENCY)
Facility: HOSPITAL | Age: 67
Discharge: HOME | End: 2022-02-17
Attending: EMERGENCY MEDICINE | Admitting: EMERGENCY MEDICINE

## 2022-02-17 VITALS
TEMPERATURE: 98.4 F | HEART RATE: 84 BPM | RESPIRATION RATE: 22 BRPM | OXYGEN SATURATION: 98 % | SYSTOLIC BLOOD PRESSURE: 123 MMHG | DIASTOLIC BLOOD PRESSURE: 93 MMHG

## 2022-02-17 VITALS
DIASTOLIC BLOOD PRESSURE: 70 MMHG | WEIGHT: 253 LBS | HEIGHT: 73 IN | HEART RATE: 94 BPM | BODY MASS INDEX: 33.53 KG/M2 | SYSTOLIC BLOOD PRESSURE: 114 MMHG | TEMPERATURE: 99.3 F | OXYGEN SATURATION: 98 %

## 2022-02-17 DIAGNOSIS — R00.2 PALPITATIONS: ICD-10-CM

## 2022-02-17 DIAGNOSIS — I48.91 ATRIAL FIBRILLATION, UNSPECIFIED TYPE: Primary | ICD-10-CM

## 2022-02-17 DIAGNOSIS — I48.91 NEW ONSET ATRIAL FIBRILLATION: ICD-10-CM

## 2022-02-17 DIAGNOSIS — R53.83 FATIGUE, UNSPECIFIED TYPE: ICD-10-CM

## 2022-02-17 DIAGNOSIS — I48.91 NEW ONSET ATRIAL FIBRILLATION: Primary | ICD-10-CM

## 2022-02-17 LAB
ANION GAP SERPL CALCULATED.3IONS-SCNC: 11.5 MMOL/L (ref 5–15)
BASOPHILS # BLD AUTO: 0.02 10*3/MM3 (ref 0–0.2)
BASOPHILS NFR BLD AUTO: 0.2 % (ref 0–1.5)
BUN SERPL-MCNC: 14 MG/DL (ref 8–23)
BUN/CREAT SERPL: 11.1 (ref 7–25)
CALCIUM SPEC-SCNC: 9.9 MG/DL (ref 8.6–10.5)
CHLORIDE SERPL-SCNC: 98 MMOL/L (ref 98–107)
CO2 SERPL-SCNC: 23.5 MMOL/L (ref 22–29)
CREAT SERPL-MCNC: 1.26 MG/DL (ref 0.76–1.27)
DEPRECATED RDW RBC AUTO: 45.7 FL (ref 37–54)
EOSINOPHIL # BLD AUTO: 0.04 10*3/MM3 (ref 0–0.4)
EOSINOPHIL NFR BLD AUTO: 0.4 % (ref 0.3–6.2)
ERYTHROCYTE [DISTWIDTH] IN BLOOD BY AUTOMATED COUNT: 13.3 % (ref 12.3–15.4)
GFR SERPL CREATININE-BSD FRML MDRD: 57 ML/MIN/1.73
GLUCOSE SERPL-MCNC: 115 MG/DL (ref 65–99)
HCT VFR BLD AUTO: 47.1 % (ref 37.5–51)
HGB BLD-MCNC: 15.2 G/DL (ref 13–17.7)
IMM GRANULOCYTES # BLD AUTO: 0.02 10*3/MM3 (ref 0–0.05)
IMM GRANULOCYTES NFR BLD AUTO: 0.2 % (ref 0–0.5)
LYMPHOCYTES # BLD AUTO: 0.41 10*3/MM3 (ref 0.7–3.1)
LYMPHOCYTES NFR BLD AUTO: 4 % (ref 19.6–45.3)
MCH RBC QN AUTO: 29.8 PG (ref 26.6–33)
MCHC RBC AUTO-ENTMCNC: 32.3 G/DL (ref 31.5–35.7)
MCV RBC AUTO: 92.4 FL (ref 79–97)
MONOCYTES # BLD AUTO: 0.52 10*3/MM3 (ref 0.1–0.9)
MONOCYTES NFR BLD AUTO: 5.1 % (ref 5–12)
NEUTROPHILS NFR BLD AUTO: 9.25 10*3/MM3 (ref 1.7–7)
NEUTROPHILS NFR BLD AUTO: 90.1 % (ref 42.7–76)
NRBC BLD AUTO-RTO: 0 /100 WBC (ref 0–0.2)
PLATELET # BLD AUTO: 219 10*3/MM3 (ref 140–450)
PMV BLD AUTO: 10 FL (ref 6–12)
POTASSIUM SERPL-SCNC: 3.9 MMOL/L (ref 3.5–5.2)
QT INTERVAL: 350 MS
RBC # BLD AUTO: 5.1 10*6/MM3 (ref 4.14–5.8)
SODIUM SERPL-SCNC: 133 MMOL/L (ref 136–145)
T4 FREE SERPL-MCNC: 1.3 NG/DL (ref 0.93–1.7)
TROPONIN T SERPL-MCNC: <0.01 NG/ML (ref 0–0.03)
TSH SERPL DL<=0.05 MIU/L-ACNC: 2.31 UIU/ML (ref 0.27–4.2)
WBC NRBC COR # BLD: 10.26 10*3/MM3 (ref 3.4–10.8)

## 2022-02-17 PROCEDURE — 84484 ASSAY OF TROPONIN QUANT: CPT | Performed by: EMERGENCY MEDICINE

## 2022-02-17 PROCEDURE — 99212 OFFICE O/P EST SF 10 MIN: CPT | Performed by: NURSE PRACTITIONER

## 2022-02-17 PROCEDURE — 93000 ELECTROCARDIOGRAM COMPLETE: CPT | Performed by: NURSE PRACTITIONER

## 2022-02-17 PROCEDURE — 84439 ASSAY OF FREE THYROXINE: CPT | Performed by: EMERGENCY MEDICINE

## 2022-02-17 PROCEDURE — 93010 ELECTROCARDIOGRAM REPORT: CPT | Performed by: INTERNAL MEDICINE

## 2022-02-17 PROCEDURE — 99284 EMERGENCY DEPT VISIT MOD MDM: CPT

## 2022-02-17 PROCEDURE — 80048 BASIC METABOLIC PNL TOTAL CA: CPT | Performed by: EMERGENCY MEDICINE

## 2022-02-17 PROCEDURE — 93005 ELECTROCARDIOGRAM TRACING: CPT | Performed by: EMERGENCY MEDICINE

## 2022-02-17 PROCEDURE — 84443 ASSAY THYROID STIM HORMONE: CPT | Performed by: EMERGENCY MEDICINE

## 2022-02-17 PROCEDURE — 36415 COLL VENOUS BLD VENIPUNCTURE: CPT

## 2022-02-17 PROCEDURE — 85025 COMPLETE CBC W/AUTO DIFF WBC: CPT | Performed by: EMERGENCY MEDICINE

## 2022-02-17 RX ORDER — SODIUM CHLORIDE 0.9 % (FLUSH) 0.9 %
10 SYRINGE (ML) INJECTION AS NEEDED
Status: DISCONTINUED | OUTPATIENT
Start: 2022-02-17 | End: 2022-02-17 | Stop reason: HOSPADM

## 2022-02-17 RX ORDER — METOPROLOL SUCCINATE 25 MG/1
25 TABLET, EXTENDED RELEASE ORAL DAILY
Qty: 30 TABLET | Refills: 0 | Status: SHIPPED | OUTPATIENT
Start: 2022-02-17 | End: 2022-03-15 | Stop reason: SDUPTHER

## 2022-02-17 RX ADMIN — METOPROLOL TARTRATE 25 MG: 25 TABLET, FILM COATED ORAL at 11:33

## 2022-02-17 NOTE — ED TRIAGE NOTES
Patient to er from pcp office here at [facility] with c/o new onset of afib. Patient was at pcp office because of heart palpitations. Patient reported no blood thinners. Patient had EKG at pcp office which showed afib. Patient has mask on in triage along with staff.

## 2022-02-17 NOTE — ED PROVIDER NOTES
EMERGENCY DEPARTMENT ENCOUNTER    Room Number:  35/35  Date of encounter:  2/17/2022  PCP: Kyle Wallace MD  Historian: Patient      HPI:  Chief Complaint: Palpitations and fatigue  A complete HPI/ROS/PMH/PSH/SH/FH are unobtainable due to: None    Context: Darek Oakley is a 66 y.o. male who presents to the ED c/o fatigue and palpitations for the past 2 days.  This started after he ran an 8 mile run.  Symptoms have been intermittent.  They occur spontaneously and resolve spontaneously.  Nothing makes this worse or better.  He has had no fever, cough, chest pain, shortness of breath.  He saw his PCP today and was diagnosed with new onset atrial fibrillation and was sent to the emergency department.      PAST MEDICAL HISTORY  Active Ambulatory Problems     Diagnosis Date Noted   • ED (erectile dysfunction) of organic origin 04/10/2016   • Gastroesophageal reflux disease 04/10/2016   • HLD (hyperlipidemia) 04/10/2016   • LBP (low back pain) 04/10/2016   • Excessive urination at night 04/10/2016   • Allergic rhinitis, seasonal 04/10/2016   • Pulmonary nodules 04/12/2016   • Encounter for screening for malignant neoplasm of colon 05/10/2019     Resolved Ambulatory Problems     Diagnosis Date Noted   • Lung mass 04/10/2016   • Stage 3a chronic kidney disease (HCC) 04/13/2021     Past Medical History:   Diagnosis Date   • Allergic    • Erectile dysfunction    • GERD (gastroesophageal reflux disease)    • Hyperlipidemia          PAST SURGICAL HISTORY  Past Surgical History:   Procedure Laterality Date   • COLONOSCOPY  2008?     WNL   • COLONOSCOPY N/A 5/31/2019    Procedure: COLONOSCOPY into cecum with cold snare polypectomies;  Surgeon: Stuart Scott MD;  Location: [facility] ENDOSCOPY;  Service: Gastroenterology   • HERNIA REPAIR     • TONSILLECTOMY           FAMILY HISTORY  No family history on file.      SOCIAL HISTORY  Social History     Socioeconomic History   • Marital status:    Tobacco Use   • Smoking  status: Former Smoker     Packs/day: 2.00     Types: Cigarettes     Start date:      Quit date:      Years since quittin.1   • Smokeless tobacco: Never Used   • Tobacco comment: Patient quit smoking in the 1980s he says.   Substance and Sexual Activity   • Alcohol use: No   • Drug use: No         ALLERGIES  No known drug allergy        REVIEW OF SYSTEMS  Review of Systems     All systems reviewed and negative except for those discussed in HPI.       PHYSICAL EXAM    I have reviewed the triage vital signs and nursing notes.    ED Triage Vitals   Temp Heart Rate Resp BP SpO2   22 1053 22 1053 22 1053 22 1107 22 1053   98.4 °F (36.9 °C) 118 22 122/68 98 %      Temp src Heart Rate Source Patient Position BP Location FiO2 (%)   22 1053 -- -- -- --   Tympanic           Physical Exam  GENERAL: not distressed  HENT: nares patent  EYES: no scleral icterus  CV: regular rhythm, regular rate  RESPIRATORY: normal effort, clear to auscultation bilaterally  ABDOMEN: soft, nontender  MUSCULOSKELETAL: no deformity  NEURO: alert, moves all extremities, follows commands  SKIN: warm, dry        LAB RESULTS  Recent Results (from the past 24 hour(s))   ECG 12 Lead    Collection Time: 22 11:00 AM   Result Value Ref Range    QT Interval 350 ms   Basic Metabolic Panel    Collection Time: 22 11:14 AM    Specimen: Blood   Result Value Ref Range    Glucose 115 (H) 65 - 99 mg/dL    BUN 14 8 - 23 mg/dL    Creatinine 1.26 0.76 - 1.27 mg/dL    Sodium 133 (L) 136 - 145 mmol/L    Potassium 3.9 3.5 - 5.2 mmol/L    Chloride 98 98 - 107 mmol/L    CO2 23.5 22.0 - 29.0 mmol/L    Calcium 9.9 8.6 - 10.5 mg/dL    eGFR Non African Amer 57 (L) >60 mL/min/1.73    BUN/Creatinine Ratio 11.1 7.0 - 25.0    Anion Gap 11.5 5.0 - 15.0 mmol/L   Troponin    Collection Time: 22 11:14 AM    Specimen: Blood   Result Value Ref Range    Troponin T <0.010 0.000 - 0.030 ng/mL   TSH    Collection Time: 22  11:14 AM    Specimen: Blood   Result Value Ref Range    TSH 2.310 0.270 - 4.200 uIU/mL   T4, Free    Collection Time: 02/17/22 11:14 AM    Specimen: Blood   Result Value Ref Range    Free T4 1.30 0.93 - 1.70 ng/dL   CBC Auto Differential    Collection Time: 02/17/22 11:14 AM    Specimen: Blood   Result Value Ref Range    WBC 10.26 3.40 - 10.80 10*3/mm3    RBC 5.10 4.14 - 5.80 10*6/mm3    Hemoglobin 15.2 13.0 - 17.7 g/dL    Hematocrit 47.1 37.5 - 51.0 %    MCV 92.4 79.0 - 97.0 fL    MCH 29.8 26.6 - 33.0 pg    MCHC 32.3 31.5 - 35.7 g/dL    RDW 13.3 12.3 - 15.4 %    RDW-SD 45.7 37.0 - 54.0 fl    MPV 10.0 6.0 - 12.0 fL    Platelets 219 140 - 450 10*3/mm3    Neutrophil % 90.1 (H) 42.7 - 76.0 %    Lymphocyte % 4.0 (L) 19.6 - 45.3 %    Monocyte % 5.1 5.0 - 12.0 %    Eosinophil % 0.4 0.3 - 6.2 %    Basophil % 0.2 0.0 - 1.5 %    Immature Grans % 0.2 0.0 - 0.5 %    Neutrophils, Absolute 9.25 (H) 1.70 - 7.00 10*3/mm3    Lymphocytes, Absolute 0.41 (L) 0.70 - 3.10 10*3/mm3    Monocytes, Absolute 0.52 0.10 - 0.90 10*3/mm3    Eosinophils, Absolute 0.04 0.00 - 0.40 10*3/mm3    Basophils, Absolute 0.02 0.00 - 0.20 10*3/mm3    Immature Grans, Absolute 0.02 0.00 - 0.05 10*3/mm3    nRBC 0.0 0.0 - 0.2 /100 WBC       Ordered the above labs and independently reviewed the results.        RADIOLOGY  No Radiology Exams Resulted Within Past 24 Hours    I ordered the above noted radiological studies. Reviewed by me and discussed with radiologist.  See dictation for official radiology interpretation.      PROCEDURES    Procedures      MEDICATIONS GIVEN IN ER    Medications   sodium chloride 0.9 % flush 10 mL (has no administration in time range)   metoprolol tartrate (LOPRESSOR) tablet 25 mg (25 mg Oral Given 2/17/22 1133)         PROGRESS, DATA ANALYSIS, CONSULTS, AND MEDICAL DECISION MAKING    All labs have been independently reviewed by me.  All radiology studies have been reviewed by me and discussed with radiologist dictating the report.    EKG's independently viewed and interpreted by me.  Discussion below represents my analysis of pertinent findings related to patient's condition, differential diagnosis, treatment plan and final disposition.    Patient presents with new onset atrial fibrillation.  He is intermittently going in and out of A. fib while in the ER room.  Symptoms match with the electrical changes.    I am testing for underlying etiology such as hypothyroidism, electrolyte disturbance.  I have low suspicion for PE in this patient.  He is not a heavy drinker.  He is actually rather active.  He is a retired .      [id number] score is 1.    ED Course as of 02/17/22 1315   Thu Feb 17, 2022   1107 EKG interpreted by myself.  Time 11 AM.  Sinus rhythm.  Heart rate 82.  Normal intervals and axis.  No acute ST abnormality. [TD]   1234 Troponin T: <0.010 [TD]   1234 Free T4: 1.30 [TD]   1234 TSH Baseline: 2.310 [TD]   1310 I discussed the case with Dr. Marrufo, cardiology.  She recommends starting patient on Eliquis given his prior history of GI upset with aspirin.  She also recommends initiating metoprolol and follow-up with cardiology.  However, I then spoke with the patient.  He does not want to start Eliquis at this time.  He wants to go ahead and try taking the aspirin.  This clinically reasonable.  He is aware that this discussion will be revisited when he sees cardiology. [TD]      ED Course User Index  [TD] Gabino Hernandez II, MD           PPE: The patient wore a surgical mask throughout the entire patient encounter. I wore an N95.    AS OF 13:15 EST VITALS:    BP - 125/83  HR - 90  TEMP - 98.4 °F (36.9 °C) (Tympanic)  O2 SATS - 98%        DIAGNOSIS  Final diagnoses:   New onset atrial fibrillation (HCC)   Fatigue, unspecified type         DISPOSITION  DISCHARGE    FOLLOW-UP  Kyle Wallace MD  [address] [place], [place] [id number] [phone]    Schedule an appointment as soon as possible for a visit    As needed    [facility]  [address]  [address]  [facility] [address] [phone]  Schedule an appointment as soon as possible for a visit in 1 week           Medication List      New Prescriptions    metoprolol succinate XL 25 MG 24 hr tablet  Commonly known as: TOPROL-XL  Take 1 tablet by mouth Daily for 30 days.           Where to Get Your Medications      These medications were sent to [id number] - [place], [place] - [address] - [phone] [facility] [phone]   [address] [address]    Phone: [phone]   · metoprolol succinate XL 25 MG 24 hr tablet                  Gabino Hernandez II, MD  02/17/22 7831

## 2022-02-17 NOTE — PROGRESS NOTES
"Chief Complaint  Irregular Heart Beat and Fatigue    Subjective          Darek Oakley presents to [facility]IMARY [facility]  History of Present Illness    Patient is a pleasant 66-year-old male who typically sees Dr. Wallace here in the office. Patient presents to the clinic today with complaints of palpitation and fatigue after he ran 8 miles Wednesday of last week. Patient brought with him reading from his watch phone that states he is in fact in atrial fibrillation. Patient denies any chest pain pressure or shortness of breath at this time. Patient states he is very fatigued and he can feel the palpitations. Patient states other than some PVCs in the past he has had no prior heart history.      Objective   Vital Signs:   /70 (BP Location: Left arm, Patient Position: Sitting, Cuff Size: Adult)   Pulse 94   Temp 99.3 °F (37.4 °C)   Ht 185.4 cm (73\")   Wt 115 kg (253 lb)   SpO2 98%   BMI 33.38 kg/m²     Physical Exam  Vitals and nursing note reviewed.   Constitutional:       Appearance: Normal appearance.   Cardiovascular:      Rate and Rhythm: Rhythm irregular.      Comments: A Fib noted on EKG and c/o fatigue  Pulmonary:      Effort: Pulmonary effort is normal. No respiratory distress.      Breath sounds: Normal breath sounds. No stridor. No wheezing, rhonchi or rales.   Chest:      Chest wall: No tenderness.   Musculoskeletal:         General: Normal range of motion.   Skin:     General: Skin is warm.      Capillary Refill: Capillary refill takes less than 2 seconds.   Neurological:      Mental Status: He is alert and oriented to person, place, and time.   Psychiatric:         Behavior: Behavior normal.        Result Review :            ECG 12 Lead    Date/Time: 2/17/2022 10:43 AM  Performed by: Jasmine Vail APRN  Authorized by: Jasmine Vail APRN   Comparison: compared with previous ECG from 4/30/2021  Comparison to previous ECG: Sinus Bradycardia  Rhythm: atrial " fibrillation  Rate: tachycardic    Clinical impression: abnormal EKG              Assessment and Plan    Diagnoses and all orders for this visit:    1. New onset atrial fibrillation (HCC) (Primary)  -     ECG 12 Lead    2. Palpitations  -     ECG 12 Lead    3. Fatigue, unspecified type  -     ECG 12 Lead    We are taking patient over to the ER via WC now for further eval and treatment for his new onset of fatigue and Afib.       Follow Up   Return if symptoms worsen or fail to improve.  Patient was given instructions and counseling regarding his condition or for health maintenance advice. Please see specific information pulled into the AVS if appropriate.       Answers for HPI/ROS submitted by the patient on 2/17/2022  What is the primary reason for your visit?: Other  Please describe your symptoms.: Heart palpitations  Have you had these symptoms before?: Yes  How long have you been having these symptoms?: 1-2 weeks

## 2022-03-04 ENCOUNTER — HOSPITAL ENCOUNTER (OUTPATIENT)
Dept: CARDIOLOGY | Facility: HOSPITAL | Age: 67
Discharge: HOME | End: 2022-03-04
Admitting: INTERNAL MEDICINE

## 2022-03-04 ENCOUNTER — OFFICE VISIT (OUTPATIENT)
Dept: CARDIOLOGY | Facility: CLINIC | Age: 67
End: 2022-03-04

## 2022-03-04 VITALS
BODY MASS INDEX: 32.47 KG/M2 | HEART RATE: 68 BPM | HEIGHT: 73 IN | WEIGHT: 245 LBS | DIASTOLIC BLOOD PRESSURE: 60 MMHG | SYSTOLIC BLOOD PRESSURE: 110 MMHG

## 2022-03-04 VITALS
BODY MASS INDEX: 33 KG/M2 | SYSTOLIC BLOOD PRESSURE: 110 MMHG | WEIGHT: 249 LBS | HEART RATE: 54 BPM | DIASTOLIC BLOOD PRESSURE: 72 MMHG | HEIGHT: 73 IN

## 2022-03-04 DIAGNOSIS — I48.91 ATRIAL FIBRILLATION, UNSPECIFIED TYPE: ICD-10-CM

## 2022-03-04 DIAGNOSIS — I48.0 PAROXYSMAL ATRIAL FIBRILLATION: Primary | ICD-10-CM

## 2022-03-04 LAB
ASCENDING AORTA: 3.4 CM
BH CV ECHO MEAS - ACS: 2 CM
BH CV ECHO MEAS - AO MAX PG: 8.8 MMHG
BH CV ECHO MEAS - AO MEAN PG: 4.9 MMHG
BH CV ECHO MEAS - AO ROOT DIAM: 3.6 CM
BH CV ECHO MEAS - AO V2 MAX: 148 CM/SEC
BH CV ECHO MEAS - AO V2 VTI: 32.2 CM
BH CV ECHO MEAS - AVA(I,D): 2.38 CM2
BH CV ECHO MEAS - EDV(MOD-SP2): 107 ML
BH CV ECHO MEAS - EDV(MOD-SP4): 157 ML
BH CV ECHO MEAS - EF(MOD-BP): 58 %
BH CV ECHO MEAS - EF(MOD-SP2): 55.1 %
BH CV ECHO MEAS - EF(MOD-SP4): 58 %
BH CV ECHO MEAS - ESV(MOD-SP2): 48 ML
BH CV ECHO MEAS - ESV(MOD-SP4): 66 ML
BH CV ECHO MEAS - FS: 36.2 %
BH CV ECHO MEAS - IVS/LVPW: 1.09 CM
BH CV ECHO MEAS - IVSD: 1.02 CM
BH CV ECHO MEAS - LAT PEAK E' VEL: 11.3 CM/SEC
BH CV ECHO MEAS - LV DIASTOLIC VOL/BSA (35-75): 66.9 CM2
BH CV ECHO MEAS - LV MASS(C)D: 217 GRAMS
BH CV ECHO MEAS - LV MAX PG: 5.5 MMHG
BH CV ECHO MEAS - LV MEAN PG: 2.9 MMHG
BH CV ECHO MEAS - LV SYSTOLIC VOL/BSA (12-30): 28.1 CM2
BH CV ECHO MEAS - LV V1 MAX: 117.3 CM/SEC
BH CV ECHO MEAS - LV V1 VTI: 25.6 CM
BH CV ECHO MEAS - LVIDD: 5.7 CM
BH CV ECHO MEAS - LVIDS: 3.6 CM
BH CV ECHO MEAS - LVOT AREA: 3 CM2
BH CV ECHO MEAS - LVOT DIAM: 1.95 CM
BH CV ECHO MEAS - LVPWD: 0.93 CM
BH CV ECHO MEAS - MED PEAK E' VEL: 9 CM/SEC
BH CV ECHO MEAS - MR MAX PG: 21.6 MMHG
BH CV ECHO MEAS - MR MAX VEL: 232.1 CM/SEC
BH CV ECHO MEAS - MV A DUR: 0.13 SEC
BH CV ECHO MEAS - MV A MAX VEL: 90.4 CM/SEC
BH CV ECHO MEAS - MV DEC SLOPE: 202.2 CM/SEC2
BH CV ECHO MEAS - MV DEC TIME: 0.25 MSEC
BH CV ECHO MEAS - MV E MAX VEL: 95.5 CM/SEC
BH CV ECHO MEAS - MV E/A: 1.06
BH CV ECHO MEAS - MV MAX PG: 3.4 MMHG
BH CV ECHO MEAS - MV MEAN PG: 1.39 MMHG
BH CV ECHO MEAS - MV V2 VTI: 36.4 CM
BH CV ECHO MEAS - MVA(VTI): 2.11 CM2
BH CV ECHO MEAS - PA ACC TIME: 0.16 SEC
BH CV ECHO MEAS - PA PR(ACCEL): 8.9 MMHG
BH CV ECHO MEAS - PA V2 MAX: 100.4 CM/SEC
BH CV ECHO MEAS - PULM A REVS DUR: 0.11 SEC
BH CV ECHO MEAS - PULM A REVS VEL: 20.7 CM/SEC
BH CV ECHO MEAS - PULM DIAS VEL: 43.3 CM/SEC
BH CV ECHO MEAS - PULM SYS VEL: 44.7 CM/SEC
BH CV ECHO MEAS - RAP SYSTOLE: 3 MMHG
BH CV ECHO MEAS - RV MAX PG: 1.11 MMHG
BH CV ECHO MEAS - RV V1 MAX: 52.7 CM/SEC
BH CV ECHO MEAS - RV V1 VTI: 13.6 CM
BH CV ECHO MEAS - RVOT DIAM: 2.11 CM
BH CV ECHO MEAS - RVSP: 23 MMHG
BH CV ECHO MEAS - SI(MOD-SP2): 25.2 ML/M2
BH CV ECHO MEAS - SI(MOD-SP4): 38.8 ML/M2
BH CV ECHO MEAS - SV(LVOT): 76.8 ML
BH CV ECHO MEAS - SV(MOD-SP2): 59 ML
BH CV ECHO MEAS - SV(MOD-SP4): 91 ML
BH CV ECHO MEAS - SV(RVOT): 47.3 ML
BH CV ECHO MEAS - TAPSE (>1.6): 2.5 CM
BH CV ECHO MEAS - TR MAX PG: 20.3 MMHG
BH CV ECHO MEAS - TR MAX VEL: 225.3 CM/SEC
BH CV ECHO MEASUREMENTS AVERAGE E/E' RATIO: 9.41
BH CV XLRA - RV BASE: 3.9 CM
BH CV XLRA - RV LENGTH: 7.7 CM
BH CV XLRA - RV MID: 2.7 CM
BH CV XLRA - TDI S': 14.6 CM/SEC
LEFT ATRIUM VOLUME INDEX: 24.8 ML/M2
MAXIMAL PREDICTED HEART RATE: 155 BPM
SINUS: 3 CM
STJ: 3.3 CM
STRESS TARGET HR: 132 BPM

## 2022-03-04 PROCEDURE — 93306 TTE W/DOPPLER COMPLETE: CPT

## 2022-03-04 PROCEDURE — 99204 OFFICE O/P NEW MOD 45 MIN: CPT | Performed by: INTERNAL MEDICINE

## 2022-03-04 PROCEDURE — 93306 TTE W/DOPPLER COMPLETE: CPT | Performed by: INTERNAL MEDICINE

## 2022-03-04 NOTE — PROGRESS NOTES
Subjective:     Encounter Date:03/04/2022      Patient ID: Darek Oakley is a 66 y.o. male.    Chief Complaint: New onset atrial fibrillation  HPI:   This is a 66-year-old man who presents for evaluation of new atrial fibrillation.  For the last year since the mini marathon of 2021 he has noted intermittent palpitations.  Immediately after that mini marathon he was very fatigued and had palpitations that lasted for a few days.  He wore a Holter monitor shortly after that however he did not have the some symptoms but did have some PVCs.  He has had PVCs in the past and felt that the more recent onset of palpitations were different than those.  Few weeks ago he went for a long run.  He is again training for the mini marathon.  He felt extremely fatigued for several days and noted heart rates which were over 150.  He was seen in primary care and he had atrial fibrillation with heart rates in the 120s on his EKG.  He was sent directly to the emergency room.  He was started on metoprolol succinate 25 daily.  He declined oral anticoagulation but did start taking a baby aspirin.  By the for he left the EKG was repeated he had work turned to sinus rhythm.  Since starting metoprolol he has felt better.  He does still feel intermittent palpitations and fatigue.  He tried to saw a tree on his property and again developed severe palpitations, fatigue and tachycardia which improved with rest.    He is very active.  He runs about 15 miles a week.  He really has been running the mini marathon for the last 30 years.  The following portions of the patient's history were reviewed and updated as appropriate: allergies, current medications, past family history, past medical history, past social history, past surgical history and problem list.     REVIEW OF SYSTEMS:   All systems reviewed.  Pertinent positives identified in HPI.  All other systems are negative.    Past Medical History:   Diagnosis Date   • Allergic    • Erectile  dysfunction    • GERD (gastroesophageal reflux disease)    • Hyperlipidemia    • Stage 3a chronic kidney disease (HCC) 2021       Family History   Problem Relation Age of Onset   • Hyperlipidemia Mother    • Hyperlipidemia Father    • Hyperlipidemia Sister    • Hyperlipidemia Brother        Social History     Socioeconomic History   • Marital status:    Tobacco Use   • Smoking status: Former Smoker     Packs/day: 2.00     Types: Cigarettes     Start date:      Quit date:      Years since quittin.1   • Smokeless tobacco: Never Used   • Tobacco comment: Patient quit smoking in the  he says.   Vaping Use   • Vaping Use: Never used   Substance and Sexual Activity   • Alcohol use: No   • Drug use: No   • Sexual activity: Yes       Allergies   Allergen Reactions   • No Known Drug Allergy        Past Surgical History:   Procedure Laterality Date   • COLONOSCOPY  ?     WNL   • COLONOSCOPY N/A 2019    Procedure: COLONOSCOPY into cecum with cold snare polypectomies;  Surgeon: Stuart Scott MD;  Location: [facility] ENDOSCOPY;  Service: Gastroenterology   • HERNIA REPAIR     • TONSILLECTOMY         Procedures       Objective:         PHYSICAL EXAM:  GEN: VSS, no distress,   Eyes: normal sclera, normal lids and lashes  HENT: moist mucus membranes,   Respiratory: CTAB, no rales or wheezes  CV: RRR, no murmurs, , +2 DP and 2+ carotid pulses b/l  GI: NABS, soft,  Nontender, nondistended  MSK: no edema, no scoliosis or kyphosis  Skin: no rash, warm, dry  Heme/Lymph: no bruising or bleeding  Psych: organized thought, normal behavior and affect  Neuro: Cranial nerves grossly intact, Alert and Oriented x 3.         Assessment:          Diagnosis Plan   1. Paroxysmal atrial fibrillation (HCC)            Plan:       1.  Paroxysmal atrial fibrillation: This is probably going on for the last year.  I think it is going to be difficult to control his symptoms with the beta-blocker alone given that his  heart rate at rest is in the 50s and he still gets fairly tachycardic with exertion.  I have asked him to consider an ablation or an antiarrhythmic, and he will  think about it.  He is going to try and run this weekend to see how he does on the metoprolol.  His CHADSVASC score is 1 for age.  I explained that this confers about a 2% annual risk of stroke.  I do not think aspirin is helpful in reducing his stroke risk.  I explained I think it would be safer for him to be on Xarelto or Eliquis, however he is hesitant about starting any new drugs.  He is going to call me on Monday and let me know what he thinks.  An echocardiogram was ordered as an outpatient and was completed today.  I have reviewed it myself.  The final results are pending.  His overall systolic and diastolic function is normal.  No major valvular abnormalities.      Dr. Wallace, thank you very much for referring this kind patient to me. Please call me with any questions or concerns. I will see the patient again in the office in 3 months.         Enedina Santa MD  03/04/22  [facility]    Outpatient Encounter Medications as of 3/4/2022   Medication Sig Dispense Refill   • alfuzosin (UROXATRAL) 10 MG 24 hr tablet Take 10 mg by mouth Daily.     • atorvastatin (LIPITOR) 40 MG tablet Take 1 tablet by mouth Every Evening. 90 tablet 3   • diphenhydrAMINE (BENADRYL) 25 mg capsule Take 75 mg by mouth At Night As Needed for Sleep.     • metoprolol succinate XL (TOPROL-XL) 25 MG 24 hr tablet Take 1 tablet by mouth Daily for 30 days. 30 tablet 0   • omeprazole (priLOSEC) 20 MG capsule Take 1 capsule by mouth Daily. 90 capsule 3   • sildenafil (VIAGRA) 50 MG tablet Take 1 tablet by mouth As Needed for Erectile Dysfunction. 4 tablet 3     No facility-administered encounter medications on file as of 3/4/2022.

## 2022-03-07 ENCOUNTER — TELEPHONE (OUTPATIENT)
Dept: CARDIOLOGY | Facility: CLINIC | Age: 67
End: 2022-03-07

## 2022-03-07 NOTE — TELEPHONE ENCOUNTER
Pt called and lvm. He has given the discussion about anticoagulants some thought, and he would like to go ahead as planned.  He said he would like to try Eliquis or Xarelto, whichever you think is best, however, it's also going to depend on cost.    He can be reached at [phone] if you would like to discuss further?    Thank you,    Tiffanie Gonzalez, CMA

## 2022-03-07 NOTE — TELEPHONE ENCOUNTER
Pt notified that Eliquis was sent in to his pharmacy. He knows to call the office if it's not affordable, and then Dr. Santa will send in Xarelsusana.    Johana WARNER  3/7*/22

## 2022-03-15 RX ORDER — METOPROLOL SUCCINATE 25 MG/1
25 TABLET, EXTENDED RELEASE ORAL DAILY
Qty: 30 TABLET | Refills: 5 | Status: SHIPPED | OUTPATIENT
Start: 2022-03-15 | End: 2022-04-06 | Stop reason: SDUPTHER

## 2022-03-15 NOTE — TELEPHONE ENCOUNTER
Pt called and lvm asking for refills on Metoprolol succ 25mg qd, to be sent to [facility] [address].    Dose was changed by  At  ER on 2/17/22, Gabino Hernandez MD

## 2022-04-06 ENCOUNTER — TELEPHONE (OUTPATIENT)
Dept: CARDIOLOGY | Facility: CLINIC | Age: 67
End: 2022-04-06

## 2022-04-06 RX ORDER — METOPROLOL SUCCINATE 25 MG/1
25 TABLET, EXTENDED RELEASE ORAL DAILY
Qty: 90 TABLET | Refills: 3 | Status: SHIPPED | OUTPATIENT
Start: 2022-04-06 | End: 2022-04-19 | Stop reason: SDUPTHER

## 2022-04-06 NOTE — TELEPHONE ENCOUNTER
Pt called and is having to double up on his Metoprolol succ 25mg qd, due to some flare-ups with his Afib.  He is in need of refills please. In fact, he would need a new rx, if that's okay, by sending in a different quantity?  [address]    Please advise.    Thank you,    Tiffanie

## 2022-04-07 NOTE — TELEPHONE ENCOUNTER
Dr. Santa,    I called pt to tell him rx for Metoprolol was sent in, and to ask him to schedule f/u for 2 weeks.  His voicemail was full. I'll try again.    Is it okay if he follows-up with APRN? It looks like you are booked out until 5/13/22.      Tiffanie

## 2022-04-19 ENCOUNTER — OFFICE VISIT (OUTPATIENT)
Dept: CARDIOLOGY | Facility: CLINIC | Age: 67
End: 2022-04-19

## 2022-04-19 VITALS
HEIGHT: 73 IN | SYSTOLIC BLOOD PRESSURE: 130 MMHG | WEIGHT: 254 LBS | DIASTOLIC BLOOD PRESSURE: 88 MMHG | HEART RATE: 84 BPM | BODY MASS INDEX: 33.66 KG/M2

## 2022-04-19 DIAGNOSIS — I48.0 PAROXYSMAL ATRIAL FIBRILLATION: Primary | ICD-10-CM

## 2022-04-19 PROCEDURE — 99213 OFFICE O/P EST LOW 20 MIN: CPT | Performed by: INTERNAL MEDICINE

## 2022-04-19 RX ORDER — METOPROLOL SUCCINATE 50 MG/1
25 TABLET, EXTENDED RELEASE ORAL DAILY
Qty: 45 TABLET | Refills: 3 | Status: SHIPPED | OUTPATIENT
Start: 2022-04-19 | End: 2022-10-21

## 2022-04-19 NOTE — PROGRESS NOTES
Subjective:     Encounter Date: 22        Patient ID: Darek Oakley is a 66 y.o. male.    Chief Complaint: New onset atrial fibrillation  HPI:   This is a 66-year-old man who was diagnosed with AF in 2022.   He is an avid runner and was having debilitating tachycardia. We started Toprol 25 daily, and he has had to take an additional tablet about once a week for palpitations. Previously his AF lasted several hours to days, now with additional toprol it resolves within an hour or 2. He uses an Apple watch and [employer] annalisa to track his symptoms. He is tolerating Eliquis. At present he is pleased with the control he has now on palpitations.He is still able to run.      He is very active.  He runs about 15 miles a week.  He really has been running the mini marathon for the last 30 years.  The following portions of the patient's history were reviewed and updated as appropriate: allergies, current medications, past family history, past medical history, past social history, past surgical history and problem list.     REVIEW OF SYSTEMS:   All systems reviewed.  Pertinent positives identified in HPI.  All other systems are negative.    Past Medical History:   Diagnosis Date   • Allergic    • Erectile dysfunction    • GERD (gastroesophageal reflux disease)    • Hyperlipidemia    • Stage 3a chronic kidney disease (HCC) 2021       Family History   Problem Relation Age of Onset   • Hyperlipidemia Mother    • Hyperlipidemia Father    • Hyperlipidemia Sister    • Hyperlipidemia Brother        Social History     Socioeconomic History   • Marital status:    Tobacco Use   • Smoking status: Former Smoker     Packs/day: 2.00     Types: Cigarettes     Start date:      Quit date:      Years since quittin.3   • Smokeless tobacco: Never Used   • Tobacco comment: Patient quit smoking in the  he says.   Vaping Use   • Vaping Use: Never used   Substance and Sexual Activity   • Alcohol use: No   •  Drug use: No   • Sexual activity: Yes       Allergies   Allergen Reactions   • No Known Drug Allergy        Past Surgical History:   Procedure Laterality Date   • COLONOSCOPY  2008?     WNL   • COLONOSCOPY N/A 5/31/2019    Procedure: COLONOSCOPY into cecum with cold snare polypectomies;  Surgeon: Stuart Scott MD;  Location: [facility] ENDOSCOPY;  Service: Gastroenterology   • HERNIA REPAIR     • TONSILLECTOMY         Procedures       Objective:         PHYSICAL EXAM:  GEN: VSS, no distress,   Eyes: normal sclera, normal lids and lashes  HENT: moist mucus membranes,   Respiratory: CTAB, no rales or wheezes  CV: RRR, no murmurs, , +2 DP and 2+ carotid pulses b/l  GI: NABS, soft,  Nontender, nondistended  MSK: no edema, no scoliosis or kyphosis  Skin: no rash, warm, dry  Heme/Lymph: no bruising or bleeding  Psych: organized thought, normal behavior and affect  Neuro: Cranial nerves grossly intact, Alert and Oriented x 3.         Assessment:          Diagnosis Plan   1. Paroxysmal atrial fibrillation (HCC)            Plan:       1.  Paroxysmal atrial fibrillation: Toprol 25 daily, an additional 25 can be taken as needed for tachycardia. We discussed increasing it to 50 daily but he prefers to hold off. We also discussed ablation or antiarrhythmic therapy which we can use as a back up plan in the future. Continue Eliquis  2. Dilated LV, LVH: likely related to athelic heart. No signs of CHF.     Dr. Wallace, thank you very much for referring this kind patient to me. Please call me with any questions or concerns. I will see the patient again in the office in 6 months.         Enedina Santa MD  04/19/22  [facility]    Outpatient Encounter Medications as of 4/19/2022   Medication Sig Dispense Refill   • alfuzosin (UROXATRAL) 10 MG 24 hr tablet Take 10 mg by mouth Daily.     • apixaban (ELIQUIS) 5 MG tablet tablet Take 1 tablet by mouth 2 (Two) Times a Day. 60 tablet 3   • atorvastatin (LIPITOR) 40 MG tablet  Take 1 tablet by mouth Every Evening. 90 tablet 3   • diphenhydrAMINE (BENADRYL) 25 mg capsule Take 75 mg by mouth At Night As Needed for Sleep.     • metoprolol succinate XL (TOPROL-XL) 25 MG 24 hr tablet Take 1 tablet by mouth Daily for 30 days. 90 tablet 3   • omeprazole (priLOSEC) 20 MG capsule Take 1 capsule by mouth Daily. 90 capsule 3   • sildenafil (VIAGRA) 50 MG tablet Take 1 tablet by mouth As Needed for Erectile Dysfunction. 4 tablet 3     No facility-administered encounter medications on file as of 4/19/2022.

## 2022-06-01 DIAGNOSIS — E78.2 MIXED HYPERLIPIDEMIA: ICD-10-CM

## 2022-06-01 RX ORDER — ATORVASTATIN CALCIUM 40 MG/1
40 TABLET, FILM COATED ORAL EVERY EVENING
Qty: 90 TABLET | Refills: 3 | Status: SHIPPED | OUTPATIENT
Start: 2022-06-01 | End: 2022-11-07

## 2022-06-06 DIAGNOSIS — K21.9 GASTROESOPHAGEAL REFLUX DISEASE: ICD-10-CM

## 2022-06-06 DIAGNOSIS — K21.9 GASTROESOPHAGEAL REFLUX DISEASE, UNSPECIFIED WHETHER ESOPHAGITIS PRESENT: ICD-10-CM

## 2022-06-08 RX ORDER — OMEPRAZOLE 20 MG/1
20 CAPSULE, DELAYED RELEASE ORAL DAILY
Qty: 90 CAPSULE | Refills: 0 | Status: SHIPPED | OUTPATIENT
Start: 2022-06-08 | End: 2022-09-06

## 2022-07-13 RX ORDER — APIXABAN 5 MG/1
TABLET, FILM COATED ORAL
Qty: 60 TABLET | Refills: 3 | Status: SHIPPED | OUTPATIENT
Start: 2022-07-13 | End: 2022-07-14 | Stop reason: SDUPTHER

## 2022-07-14 NOTE — TELEPHONE ENCOUNTER
Pt called requesting a 90 day supply of Eliquis. I called pharmacy and told them to cancel the rx sent y-day for the 30 day supply, and I will resubmit for 90 days.    Gave info to Kyle/pharmacist. He has cancelled the 30 day, and knows I am resubmitting for 90 days.    I also called pt and let him know, left zaria quevedo cma  7/14/22  10:03am

## 2022-07-29 ENCOUNTER — OFFICE VISIT (OUTPATIENT)
Dept: INTERNAL MEDICINE | Facility: CLINIC | Age: 67
End: 2022-07-29

## 2022-07-29 VITALS
HEIGHT: 73 IN | WEIGHT: 254 LBS | SYSTOLIC BLOOD PRESSURE: 124 MMHG | DIASTOLIC BLOOD PRESSURE: 80 MMHG | HEART RATE: 81 BPM | RESPIRATION RATE: 16 BRPM | TEMPERATURE: 98.9 F | OXYGEN SATURATION: 97 % | BODY MASS INDEX: 33.66 KG/M2

## 2022-07-29 DIAGNOSIS — N18.31 STAGE 3A CHRONIC KIDNEY DISEASE: Chronic | ICD-10-CM

## 2022-07-29 DIAGNOSIS — M79.10 MYALGIA: ICD-10-CM

## 2022-07-29 DIAGNOSIS — I48.0 PAROXYSMAL ATRIAL FIBRILLATION: Primary | Chronic | ICD-10-CM

## 2022-07-29 DIAGNOSIS — R91.8 PULMONARY NODULES: ICD-10-CM

## 2022-07-29 DIAGNOSIS — E78.2 MIXED HYPERLIPIDEMIA: Chronic | ICD-10-CM

## 2022-07-29 DIAGNOSIS — K21.9 GASTROESOPHAGEAL REFLUX DISEASE, UNSPECIFIED WHETHER ESOPHAGITIS PRESENT: Chronic | ICD-10-CM

## 2022-07-29 PROCEDURE — 99214 OFFICE O/P EST MOD 30 MIN: CPT | Performed by: FAMILY MEDICINE

## 2022-07-29 NOTE — PROGRESS NOTES
"Chief Complaint  Hyperlipidemia    Subjective        Darek Oakley presents to [facility]  History of Present Illness     He is now being followed by cardiology for new onset atrial fibrillation.  He was diagnosed with this in February 2022.  Reviewed Dr. Santa's last note from April 2022.  He is very active and exercises.  He is tolerating the metoprolol XL 50 mg daily for rate control.  He is currently on Eliquis.  The atrial fibrillation is paroxysmal so they are holding off on ablation or antiarrhythmic therapy.  She does not seem concerned about the dilated left ventricle or LVH as he has an athletic heart and no signs of CHF.    HLD-due for recheck.  Patient taking atorvastatin 40mg as prescribed.  Trying to adhere to a low-fat diet.    GERD-stable on omeprazole 20 mg daily.  Trying to avoid food triggers.    Stage IIIa chronic kidney disease-due for recheck.  His creatinine usually stays around 1.2 with an EGFR of 57 lastly.  Needs recheck.  Tries to stay hydrated and avoid nephrotoxic agents.    Of note on chart review he has a history of pulmonary nodules which were last followed in 2017.  He had multiple CT scans prior to that and showed that he scattered pulmonary nodules were benign.          Objective   Vital Signs:  /80 (BP Location: Left arm, Patient Position: Sitting, Cuff Size: Large Adult)   Pulse 81   Temp 98.9 °F (37.2 °C) (Temporal)   Resp 16   Ht 185.4 cm (72.99\")   Wt 115 kg (254 lb)   SpO2 97%   BMI 33.52 kg/m²   Estimated body mass index is 33.52 kg/m² as calculated from the following:    Height as of this encounter: 185.4 cm (72.99\").    Weight as of this encounter: 115 kg (254 lb).          Physical Exam  Vitals and nursing note reviewed.   Constitutional:       General: He is not in acute distress.     Appearance: Normal appearance.   Cardiovascular:      Rate and Rhythm: Normal rate and regular rhythm.      Heart sounds: Normal heart sounds. No " murmur heard.  Pulmonary:      Effort: Pulmonary effort is normal.      Breath sounds: Normal breath sounds.   Neurological:      Mental Status: He is alert.        Result Review :  The following data was reviewed by: Kyle Wallace MD on 07/29/2022:  Common labs    Common Labsle 1/17/22 1/17/22 1/17/22 2/17/22 2/17/22    1006 1006 1006 1114 1114   Glucose  97   115 (A)   BUN  15   14   Creatinine  1.25   1.26   eGFR Non African Am  60   57 (A)   eGFR African Am  69      Sodium  140   133 (A)   Potassium  4.5   3.9   Chloride  105   98   Calcium  9.8   9.9   Total Protein  6.8      Albumin  4.5      Total Bilirubin  0.3      Alkaline Phosphatase  96      AST (SGOT)  31      ALT (SGPT)  38      WBC 6.7   10.26    Hemoglobin 14.4   15.2    Hematocrit 44.5   47.1    Platelets 247   219    Total Cholesterol   179     Triglycerides   161 (A)     HDL Cholesterol   43     LDL Cholesterol    108 (A)     (A) Abnormal value       Comments are available for some flowsheets but are not being displayed.                     Assessment and Plan   Diagnoses and all orders for this visit:    1. Paroxysmal atrial fibrillation (HCC) (Primary)    2. Mixed hyperlipidemia  -     Comprehensive Metabolic Panel; Future  -     Lipid Panel With LDL / HDL Ratio; Future  -     CBC & Differential; Future    3. Gastroesophageal reflux disease, unspecified whether esophagitis present    4. Stage 3a chronic kidney disease (HCC)  -     Comprehensive Metabolic Panel; Future  -     CBC & Differential; Future    5. Pulmonary nodules  -     CT Chest Without Contrast; Future    6. Myalgia  -     Comprehensive Metabolic Panel; Future  -     CBC & Differential; Future  -     CK; Future      Stable chronic conditions as above.  I would recommend cutting the atorvastatin in half and see if the myalgias get better and the cholesterol is improving.      Also since it has been 5 years since his last CT of his chest, I would like to get 1 more image of his  chest to ensure there are no new nodules and then I think we can discontinue further imaging if there are no changes.         Follow Up   Return in about 3 months (around 11/7/2022) for Next scheduled follow up - HLD.  Patient was given instructions and counseling regarding his condition or for health maintenance advice. Please see specific information pulled into the AVS if appropriate.

## 2022-08-30 ENCOUNTER — HOSPITAL ENCOUNTER (OUTPATIENT)
Dept: CT IMAGING | Facility: HOSPITAL | Age: 67
Discharge: HOME | End: 2022-08-30
Admitting: FAMILY MEDICINE

## 2022-08-30 DIAGNOSIS — R91.8 PULMONARY NODULES: ICD-10-CM

## 2022-08-30 PROCEDURE — 71250 CT THORAX DX C-: CPT

## 2022-09-05 DIAGNOSIS — K21.9 GASTROESOPHAGEAL REFLUX DISEASE, UNSPECIFIED WHETHER ESOPHAGITIS PRESENT: ICD-10-CM

## 2022-09-05 DIAGNOSIS — K21.9 GASTROESOPHAGEAL REFLUX DISEASE: ICD-10-CM

## 2022-09-06 RX ORDER — OMEPRAZOLE 20 MG/1
20 CAPSULE, DELAYED RELEASE ORAL DAILY
Qty: 90 CAPSULE | Refills: 0 | Status: SHIPPED | OUTPATIENT
Start: 2022-09-06 | End: 2022-12-08

## 2022-10-21 ENCOUNTER — OFFICE VISIT (OUTPATIENT)
Dept: CARDIOLOGY | Facility: CLINIC | Age: 67
End: 2022-10-21

## 2022-10-21 VITALS
DIASTOLIC BLOOD PRESSURE: 78 MMHG | HEART RATE: 95 BPM | WEIGHT: 255 LBS | BODY MASS INDEX: 33.8 KG/M2 | SYSTOLIC BLOOD PRESSURE: 102 MMHG | HEIGHT: 73 IN

## 2022-10-21 DIAGNOSIS — I48.0 PAROXYSMAL ATRIAL FIBRILLATION: Primary | ICD-10-CM

## 2022-10-21 PROCEDURE — 93000 ELECTROCARDIOGRAM COMPLETE: CPT | Performed by: INTERNAL MEDICINE

## 2022-10-21 PROCEDURE — 99214 OFFICE O/P EST MOD 30 MIN: CPT | Performed by: INTERNAL MEDICINE

## 2022-10-21 RX ORDER — METOPROLOL SUCCINATE 25 MG/1
25 TABLET, EXTENDED RELEASE ORAL 2 TIMES DAILY
Qty: 180 TABLET | Refills: 3 | Status: SHIPPED | OUTPATIENT
Start: 2022-10-21

## 2022-10-21 RX ORDER — METOPROLOL SUCCINATE 25 MG/1
25 TABLET, EXTENDED RELEASE ORAL 2 TIMES DAILY
Qty: 90 TABLET | Refills: 3 | Status: SHIPPED | OUTPATIENT
Start: 2022-10-21 | End: 2022-10-21

## 2022-10-21 RX ORDER — METOPROLOL SUCCINATE 50 MG/1
25 TABLET, EXTENDED RELEASE ORAL 2 TIMES DAILY
Qty: 90 TABLET | Refills: 3 | Status: CANCELLED | OUTPATIENT
Start: 2022-10-21 | End: 2023-10-16

## 2022-10-21 NOTE — PROGRESS NOTES
Subjective:     Encounter Date: 10/21/22        Patient ID: Darek Oakley is a 67 y.o. male.    Chief Complaint: New onset atrial fibrillation  HPI:   This is a 67-year-old man who was diagnosed with AF in 2022.   He is an avid runner and was having debilitating tachycardia.  This improved substantially with the addition of Toprol.  We increased Toprol to 25 mg twice daily due to weekly A. fib episodes.  Currently he is only having 1 A. fib episode per month.  It resolved spontaneously after a night of sleep.  He does feel exhausted but has not had any rapid heart rates noted on his cardia mobile or Gar[employer] watch.  Otherwise no complaints.  Hearing the mini marathon this year in about 3 hours.   He is very active.  He runs about 15 miles a week.  He really has been running the mini marathon for the last 30 years.  The following portions of the patient's history were reviewed and updated as appropriate: allergies, current medications, past family history, past medical history, past social history, past surgical history and problem list.     REVIEW OF SYSTEMS:   All systems reviewed.  Pertinent positives identified in HPI.  All other systems are negative.    Past Medical History:   Diagnosis Date   • Abnormal ECG 2021    Atrial Fibrillation   • Allergic    • Erectile dysfunction    • GERD (gastroesophageal reflux disease)    • Hyperlipidemia    • Stage 3a chronic kidney disease (HCC) 2021       Family History   Problem Relation Age of Onset   • Hyperlipidemia Mother    • Hyperlipidemia Father    • Hyperlipidemia Sister    • Hyperlipidemia Brother    • Arrhythmia Sister        Social History     Socioeconomic History   • Marital status:    Tobacco Use   • Smoking status: Former     Packs/day: 2.00     Years: 10.00     Pack years: 20.00     Types: Cigarettes     Start date: 1973     Quit date: 1984     Years since quittin.8   • Smokeless tobacco: Never   • Tobacco comments:      Patient quit smoking in the 1980s he says.   Vaping Use   • Vaping Use: Never used   Substance and Sexual Activity   • Alcohol use: No   • Drug use: No   • Sexual activity: Yes     Partners: Female     Birth control/protection: Surgical, Post-menopausal, None, Vasectomy     Comment: vasectomy       Allergies   Allergen Reactions   • No Known Drug Allergy        Past Surgical History:   Procedure Laterality Date   • COLONOSCOPY  2008?     WNL   • COLONOSCOPY N/A 5/31/2019    Procedure: COLONOSCOPY into cecum with cold snare polypectomies;  Surgeon: Stuart Scott MD;  Location: [facility] ENDOSCOPY;  Service: Gastroenterology   • HERNIA REPAIR     • TONSILLECTOMY           ECG 12 Lead    Date/Time: 10/21/2022 2:16 PM  Performed by: Enedina Santa MD  Authorized by: Enedina Santa MD   Comparison: compared with previous ECG from 2/17/2022  Similar to previous ECG  Rhythm: atrial fibrillation  Rate: normal  QRS axis: normal  Other findings: non-specific ST-T wave changes    Clinical impression: abnormal EKG               Objective:         PHYSICAL EXAM:  GEN: VSS, no distress,   Eyes: normal sclera, normal lids and lashes  HENT: moist mucus membranes,   Respiratory: CTAB, no rales or wheezes  CV: RRR, no murmurs, , +2 DP and 2+ carotid pulses b/l  GI: NABS, soft,  Nontender, nondistended  MSK: no edema, no scoliosis or kyphosis  Skin: no rash, warm, dry  Heme/Lymph: no bruising or bleeding  Psych: organized thought, normal behavior and affect  Neuro: Cranial nerves grossly intact, Alert and Oriented x 3.         Assessment:          Diagnosis Plan   1. Paroxysmal atrial fibrillation (HCC)               Plan:       1.  Paroxysmal atrial fibrillation: Currently having 1 episode per month.  No RVR.  Resolves spontaneously within 24 hours.  To new Toprol 25 mg twice daily.  Can consider ablation in the future if symptoms become more persistent.  Eliquis for anticoagulation.  2. Dilated LV, LVH: likely related to athelic  heart. No signs of CHF.     Dr. Wallace, thank you very much for referring this kind patient to me. Please call me with any questions or concerns. I will see the patient again in the office in 12 months.         Enedina Santa MD  10/21/22  [facility]    Outpatient Encounter Medications as of 10/21/2022   Medication Sig Dispense Refill   • alfuzosin (UROXATRAL) 10 MG 24 hr tablet Take 10 mg by mouth Daily.     • apixaban (Eliquis) 5 MG tablet tablet Take 1 tablet by mouth 2 (Two) Times a Day. 180 tablet 1   • atorvastatin (LIPITOR) 40 MG tablet TAKE 1 TABLET BY MOUTH EVERY EVENING 90 tablet 3   • diphenhydrAMINE (BENADRYL) 25 mg capsule Take 75 mg by mouth At Night As Needed for Sleep.     • metoprolol succinate XL (TOPROL-XL) 50 MG 24 hr tablet Take 0.5 tablets by mouth Daily for 360 days. May take an additional half tablet for HR sustained >130 45 tablet 3   • omeprazole (priLOSEC) 20 MG capsule TAKE 1 CAPSULE BY MOUTH DAILY 90 capsule 0   • sildenafil (VIAGRA) 50 MG tablet Take 1 tablet by mouth As Needed for Erectile Dysfunction. 4 tablet 3     No facility-administered encounter medications on file as of 10/21/2022.

## 2022-11-07 ENCOUNTER — OFFICE VISIT (OUTPATIENT)
Dept: INTERNAL MEDICINE | Facility: CLINIC | Age: 67
End: 2022-11-07

## 2022-11-07 VITALS
DIASTOLIC BLOOD PRESSURE: 70 MMHG | HEIGHT: 73 IN | SYSTOLIC BLOOD PRESSURE: 118 MMHG | HEART RATE: 53 BPM | OXYGEN SATURATION: 98 % | TEMPERATURE: 97.3 F | RESPIRATION RATE: 18 BRPM | BODY MASS INDEX: 33.93 KG/M2 | WEIGHT: 256 LBS

## 2022-11-07 DIAGNOSIS — E78.2 MIXED HYPERLIPIDEMIA: Primary | Chronic | ICD-10-CM

## 2022-11-07 DIAGNOSIS — N18.2 STAGE 2 CHRONIC KIDNEY DISEASE: Chronic | ICD-10-CM

## 2022-11-07 DIAGNOSIS — I48.0 PAROXYSMAL ATRIAL FIBRILLATION: Chronic | ICD-10-CM

## 2022-11-07 PROCEDURE — 99214 OFFICE O/P EST MOD 30 MIN: CPT | Performed by: FAMILY MEDICINE

## 2022-11-07 RX ORDER — ATORVASTATIN CALCIUM 40 MG/1
20 TABLET, FILM COATED ORAL EVERY EVENING
Qty: 45 TABLET | Refills: 4
Start: 2022-11-07

## 2022-11-07 NOTE — PROGRESS NOTES
"Chief Complaint  Follow-up and Palpitations    Subjective        Darek Oakley presents to [facility]  History of Present Illness     HLD-stable.  Patient taking atorvastatin as prescribed.  Trying to adhere to a balanced diet.  Creatine kinase is within normal limits.    Paroxysmal atrial fibrillation is stable on the metoprolol and Eliquis.  Rate controlled.  He is tolerating on the Eliquis and no bleeding issues.  His cardiologist is saying he may ablation if he gets worse with runs of a-fib.  He does not usually get high tachycardia but can get palpitations.      chronic kidney disease-most recent lab would show that he is in stage II chronic kidney disease which is an improvement.  Encouraged to avoid NSAIDs and drink lots of fluids especially during the cold season.      Objective   Vital Signs:  /70 (BP Location: Left arm, Patient Position: Sitting, Cuff Size: Large Adult)   Pulse 53   Temp 97.3 °F (36.3 °C)   Resp 18   Ht 185.4 cm (73\")   Wt 116 kg (256 lb)   SpO2 98%   BMI 33.78 kg/m²   Estimated body mass index is 33.78 kg/m² as calculated from the following:    Height as of this encounter: 185.4 cm (73\").    Weight as of this encounter: 116 kg (256 lb).          Physical Exam  Vitals and nursing note reviewed.   Constitutional:       General: He is not in acute distress.     Appearance: Normal appearance.   Cardiovascular:      Rate and Rhythm: Normal rate and regular rhythm.      Heart sounds: Normal heart sounds. No murmur heard.  Pulmonary:      Effort: Pulmonary effort is normal.      Breath sounds: Normal breath sounds.   Neurological:      Mental Status: He is alert.        Result Review :  The following data was reviewed by: Kyle Wallace MD on 11/07/2022:  Common labs    Common Labs 1/17/22 1/17/22 1/17/22 2/17/22 2/17/22 10/31/22 10/31/22 10/31/22    1006 1006 1006 1114 1114 1025 1025 1025   Glucose  97   115 (A) 101 (A)     BUN  15   14 13   "   Creatinine  1.25   1.26 1.28 (A)     eGFR Non  Am  60   57 (A)      eGFR African Am  69         Sodium  140   133 (A) 139     Potassium  4.5   3.9 4.9     Chloride  105   98 104     Calcium  9.8   9.9 9.6     Total Protein  6.8    6.1     Albumin  4.5    4.20     Total Bilirubin  0.3    0.5     Alkaline Phosphatase  96    94     AST (SGOT)  31    30     ALT (SGPT)  38    33     WBC 6.7   10.26    7.20   Hemoglobin 14.4   15.2    13.7   Hematocrit 44.5   47.1    42.5   Platelets 247   219    230   Total Cholesterol   179    179    Triglycerides   161 (A)    181 (A)    HDL Cholesterol   43    36 (A)    LDL Cholesterol    108 (A)    111 (A)    (A) Abnormal value       Comments are available for some flowsheets but are not being displayed.                     Assessment and Plan   Diagnoses and all orders for this visit:    1. Mixed hyperlipidemia (Primary)  -     CBC (No Diff); Future  -     Comprehensive Metabolic Panel; Future  -     Lipid Panel With LDL / HDL Ratio; Future    2. Paroxysmal atrial fibrillation (HCC)  -     CBC (No Diff); Future  -     Comprehensive Metabolic Panel; Future    3. Stage 2 chronic kidney disease  -     Comprehensive Metabolic Panel; Future  -     Microalbumin / Creatinine Urine Ratio - Urine, Clean Catch; Future        Stable chronic conditions as above.  Kidneys improving.  Continue all medications as above.  Labs reviewed and ordered for next time.           Follow Up   Return in about 26 weeks (around 5/8/2023) for Annual physical.  Patient was given instructions and counseling regarding his condition or for health maintenance advice. Please see specific information pulled into the AVS if appropriate.

## 2022-12-07 DIAGNOSIS — K21.9 GASTROESOPHAGEAL REFLUX DISEASE, UNSPECIFIED WHETHER ESOPHAGITIS PRESENT: ICD-10-CM

## 2022-12-07 DIAGNOSIS — K21.9 GASTROESOPHAGEAL REFLUX DISEASE: ICD-10-CM

## 2022-12-08 RX ORDER — OMEPRAZOLE 20 MG/1
20 CAPSULE, DELAYED RELEASE ORAL DAILY
Qty: 90 CAPSULE | Refills: 0 | Status: SHIPPED | OUTPATIENT
Start: 2022-12-08 | End: 2023-03-06

## 2022-12-21 RX ORDER — METOPROLOL SUCCINATE 25 MG/1
TABLET, EXTENDED RELEASE ORAL
Qty: 30 TABLET | OUTPATIENT
Start: 2022-12-21

## 2023-01-20 RX ORDER — APIXABAN 5 MG/1
TABLET, FILM COATED ORAL
Qty: 180 TABLET | Refills: 1 | Status: SHIPPED | OUTPATIENT
Start: 2023-01-20

## 2023-03-04 DIAGNOSIS — K21.9 GASTROESOPHAGEAL REFLUX DISEASE: ICD-10-CM

## 2023-03-04 DIAGNOSIS — K21.9 GASTROESOPHAGEAL REFLUX DISEASE, UNSPECIFIED WHETHER ESOPHAGITIS PRESENT: ICD-10-CM

## 2023-03-06 RX ORDER — OMEPRAZOLE 20 MG/1
20 CAPSULE, DELAYED RELEASE ORAL DAILY
Qty: 90 CAPSULE | Refills: 0 | Status: SHIPPED | OUTPATIENT
Start: 2023-03-06

## 2023-04-28 RX ORDER — METOPROLOL SUCCINATE 50 MG/1
50 TABLET, EXTENDED RELEASE ORAL 2 TIMES DAILY
Qty: 180 TABLET | Refills: 3 | Status: SHIPPED | OUTPATIENT
Start: 2023-04-28

## 2023-04-28 NOTE — TELEPHONE ENCOUNTER
Pt called for new rx - Metoprolol 50mg bid  Dr. Santa agreed to change dose at the 4/19/22 appt.    Plan       1.  Paroxysmal atrial fibrillation: Toprol 25 daily, an additional 25 can be taken as needed for tachycardia. We discussed increasing it to 50 daily but he prefers to hold off. We also discussed ablation or antiarrhythmic therapy which we can use as a back up plan in the future. Continue Eliquis  2. Dilated LV, LVH: likely related to athelic heart. No signs of CHF.      Dr. Wallace, thank you very much for referring this kind patient to me. Please call me with any questions or concerns. I will see the patient again in the office in 6 months.              Enedina Santa MD  04/19/22    Thank you,    TIMMY Moreno

## 2023-05-08 ENCOUNTER — OFFICE VISIT (OUTPATIENT)
Dept: INTERNAL MEDICINE | Facility: CLINIC | Age: 68
End: 2023-05-08
Payer: COMMERCIAL

## 2023-05-08 VITALS
WEIGHT: 248 LBS | HEIGHT: 73 IN | SYSTOLIC BLOOD PRESSURE: 122 MMHG | OXYGEN SATURATION: 99 % | DIASTOLIC BLOOD PRESSURE: 82 MMHG | HEART RATE: 55 BPM | RESPIRATION RATE: 18 BRPM | BODY MASS INDEX: 32.87 KG/M2

## 2023-05-08 DIAGNOSIS — Z00.00 ENCOUNTER FOR HEALTH MAINTENANCE EXAMINATION: Primary | ICD-10-CM

## 2023-05-08 DIAGNOSIS — E78.2 MIXED HYPERLIPIDEMIA: ICD-10-CM

## 2023-05-08 DIAGNOSIS — Z12.11 SCREEN FOR COLON CANCER: ICD-10-CM

## 2023-05-08 DIAGNOSIS — Z86.010 HISTORY OF COLON POLYPS: ICD-10-CM

## 2023-05-08 DIAGNOSIS — Z23 NEED FOR PNEUMOCOCCAL 20-VALENT CONJUGATE VACCINATION: ICD-10-CM

## 2023-05-08 RX ORDER — ATORVASTATIN CALCIUM 20 MG/1
20 TABLET, FILM COATED ORAL EVERY EVENING
Qty: 90 TABLET | Refills: 4 | Status: SHIPPED | OUTPATIENT
Start: 2023-05-08

## 2023-05-08 NOTE — PROGRESS NOTES
"Chief Complaint   Patient presents with   • Annual Exam       Subjective       CPE- Patient is here today for annual physical.      Labs: Labs were completed recently and reviewed with the patient as below.  He completed the mini-marathon a few days before the lab which would explain the elevated kidney numbers and he had some NSAIDs.    Colorectal cancer screening: Up-to-date, next due 2029      Vitals:    05/08/23 1135   BP: 122/82   BP Location: Left arm   Patient Position: Sitting   Cuff Size: Small Adult   Pulse: 55   Resp: 18   SpO2: 99%   Weight: 112 kg (248 lb)   Height: 185.4 cm (73\")         Physical Exam  Vitals and nursing note reviewed.   Constitutional:       General: He is not in acute distress.     Appearance: Normal appearance.   HENT:      Right Ear: Tympanic membrane, ear canal and external ear normal.      Left Ear: Tympanic membrane, ear canal and external ear normal.      Nose: Nose normal.      Mouth/Throat:      Mouth: Mucous membranes are moist.   Eyes:      General:         Right eye: No discharge.         Left eye: No discharge.      Conjunctiva/sclera: Conjunctivae normal.   Cardiovascular:      Rate and Rhythm: Normal rate and regular rhythm.      Pulses: Normal pulses.      Heart sounds: Normal heart sounds.   Pulmonary:      Effort: Pulmonary effort is normal.      Breath sounds: Normal breath sounds.   Abdominal:      General: Bowel sounds are normal. There is no distension.      Palpations: Abdomen is soft.      Tenderness: There is no abdominal tenderness.   Musculoskeletal:      Cervical back: Neck supple.      Right lower leg: No edema.      Left lower leg: No edema.   Lymphadenopathy:      Cervical: No cervical adenopathy.   Skin:     General: Skin is warm.      Findings: No rash.   Neurological:      General: No focal deficit present.      Mental Status: He is alert. Mental status is at baseline.   Psychiatric:         Mood and Affect: Mood normal.         Behavior: Behavior " normal.           Common labs        10/31/2022    10:25 5/1/2023    10:46   Common Labs   Glucose 101   103     BUN 13   13     Creatinine 1.28   1.46     Sodium 139   141     Potassium 4.9   4.6     Chloride 104   105     Calcium 9.6   9.8     Total Protein 6.1   6.7     Albumin 4.20   4.3     Total Bilirubin 0.5   0.5     Alkaline Phosphatase 94   93     AST (SGOT) 30   40     ALT (SGPT) 33   40     WBC 7.20   6.12     Hemoglobin 13.7   13.6     Hematocrit 42.5   43.4     Platelets 230   276     Total Cholesterol 179   193     Triglycerides 181   142     HDL Cholesterol 36   44     LDL Cholesterol  111   124     Microalbumin, Urine  5.1             Diagnoses and all orders for this visit:    1. Encounter for health maintenance examination (Primary)    2. Mixed hyperlipidemia  -     atorvastatin (LIPITOR) 20 MG tablet; Take 1 tablet by mouth Every Evening.  Dispense: 90 tablet; Refill: 4    3. History of colon polyps  -     Ambulatory Referral For Screening Colonoscopy    4. Screen for colon cancer  -     Ambulatory Referral For Screening Colonoscopy           The patient was counseled regarding nutrition, physical activity, healthy weight, injury prevention, misuse of tobacco, alcohol and illicit drugs, mental health, immunizations, and screenings.    No follow-ups on file.

## 2023-05-23 ENCOUNTER — PRE-PROCEDURE SCREENING (OUTPATIENT)
Dept: GASTROENTEROLOGY | Facility: CLINIC | Age: 68
End: 2023-05-23
Payer: COMMERCIAL

## 2023-05-23 NOTE — TELEPHONE ENCOUNTER
LAST SCOPE 5/19 IN Epic --Personal history of polyps--No family  history  of polyps or   colon cancer--ELIQUIS --Medications:            alfuzosin (UROXATRAL) 10 MG 24 hr tablet  atorvastatin (LIPITOR) 20 MG tablet  diphenhydrAMINE (BENADRYL) 25 mg capsule  Eliquis 5 MG tablet tablet  metoprolol succinate XL (TOPROL-XL) 50 MG 24 hr tablet  omeprazole (priLOSEC) 20 MG capsule  sildenafil (VIAGRA) 50 MG tablet          QUESTIONNAIRE SCREENING  SCAN IN  MEDIA & HAS BEEN SENT TO DOCTOR FOR REVIEW

## 2023-06-03 DIAGNOSIS — K21.9 GASTROESOPHAGEAL REFLUX DISEASE: ICD-10-CM

## 2023-06-03 DIAGNOSIS — K21.9 GASTROESOPHAGEAL REFLUX DISEASE, UNSPECIFIED WHETHER ESOPHAGITIS PRESENT: ICD-10-CM

## 2023-06-04 DIAGNOSIS — D12.6 ADENOMATOUS POLYP OF COLON, UNSPECIFIED PART OF COLON: Primary | ICD-10-CM

## 2023-06-05 RX ORDER — OMEPRAZOLE 20 MG/1
20 CAPSULE, DELAYED RELEASE ORAL DAILY
Qty: 90 CAPSULE | Refills: 0 | Status: SHIPPED | OUTPATIENT
Start: 2023-06-05

## 2023-06-12 RX ORDER — APIXABAN 5 MG/1
TABLET, FILM COATED ORAL
Qty: 180 TABLET | Refills: 1 | Status: SHIPPED | OUTPATIENT
Start: 2023-06-12

## 2023-06-30 ENCOUNTER — TELEPHONE (OUTPATIENT)
Dept: GASTROENTEROLOGY | Facility: CLINIC | Age: 68
End: 2023-06-30

## 2023-06-30 NOTE — TELEPHONE ENCOUNTER
Caller: Darek Oakley    Relationship to patient: Self    Best call back number: [phone]    Patient is needing: PATIENT MISSED A CALL FROM [facility] TO SCHEDULE HIS SCOPE. RETURNING HIS CALL.

## 2023-08-04 ENCOUNTER — TELEPHONE (OUTPATIENT)
Dept: GASTROENTEROLOGY | Facility: CLINIC | Age: 68
End: 2023-08-04
Payer: COMMERCIAL

## 2023-08-04 NOTE — TELEPHONE ENCOUNTER
CALLER: Darek Oakley    RELATIONSHIP TO PATIENT: Self    BEST CALL BACK NUMBER: [phone]    CALL REGARDING: To Schedule Colonoscopy    Patient Request a Call Back

## 2023-08-07 ENCOUNTER — TELEPHONE (OUTPATIENT)
Dept: GASTROENTEROLOGY | Facility: CLINIC | Age: 68
End: 2023-08-07
Payer: COMMERCIAL

## 2023-08-09 ENCOUNTER — TELEPHONE (OUTPATIENT)
Dept: GASTROENTEROLOGY | Facility: CLINIC | Age: 68
End: 2023-08-09

## 2023-08-09 NOTE — TELEPHONE ENCOUNTER
Hub staff attempted to follow warm transfer process and was unsuccessful     Caller: Darek Oakley    Relationship to patient: Self    Best call back number: [phone]     Patient is needing: PT RETURNING CALL TO SCHEDULE SCOPE. CALL BACK ANYTIME. OK TO M.

## 2023-08-09 NOTE — TELEPHONE ENCOUNTER
Hub staff attempted to follow warm transfer process and was unsuccessful     Caller: Darek Oakley    Relationship to patient: Self    Best call back number: [phone]     Patient is needing: PT RETURNING CALL TO SCHEDULE SCOPE. CALL BACK ANYTIME. OK TO M.

## 2023-08-10 ENCOUNTER — TELEPHONE (OUTPATIENT)
Dept: GASTROENTEROLOGY | Facility: CLINIC | Age: 68
End: 2023-08-10
Payer: COMMERCIAL

## 2023-08-10 PROBLEM — D12.6 ADENOMATOUS POLYP OF COLON: Status: ACTIVE | Noted: 2023-08-10

## 2023-08-10 NOTE — TELEPHONE ENCOUNTER
RAFA jordan  for COLONOSCOPY on 10/18/2023  arrive at  11am . mailed prep instructions to verified address on file....[employer] OK FOR HUB TO READ

## 2023-08-31 DIAGNOSIS — K21.9 GASTROESOPHAGEAL REFLUX DISEASE, UNSPECIFIED WHETHER ESOPHAGITIS PRESENT: ICD-10-CM

## 2023-08-31 DIAGNOSIS — K21.9 GASTROESOPHAGEAL REFLUX DISEASE: ICD-10-CM

## 2023-09-01 RX ORDER — OMEPRAZOLE 20 MG/1
20 CAPSULE, DELAYED RELEASE ORAL DAILY
Qty: 90 CAPSULE | Refills: 0 | Status: SHIPPED | OUTPATIENT
Start: 2023-09-01

## 2023-10-17 ENCOUNTER — TELEPHONE (OUTPATIENT)
Dept: GASTROENTEROLOGY | Facility: CLINIC | Age: 68
End: 2023-10-17
Payer: COMMERCIAL

## 2023-10-17 ENCOUNTER — TELEPHONE (OUTPATIENT)
Dept: GASTROENTEROLOGY | Facility: CLINIC | Age: 68
End: 2023-10-17

## 2023-10-17 NOTE — TELEPHONE ENCOUNTER
Hub staff attempted to follow warm transfer process and was unsuccessful     Caller: Darek Oakley    Relationship to patient: Self    Best call back number: [phone]    Patient is needing: PATIENT NEEDING TO RESCHEDULE SCOPE.  PLEASE REACH OUT TO RESCHEDULE. THANK YOU

## 2023-10-17 NOTE — TELEPHONE ENCOUNTER
Hub staff attempted to follow warm transfer process and was unsuccessful     Caller: Darek Oakley    Relationship to patient: Self    Best call back number: [phone]    Patient is needing: PATIENT NEEDING TO RESCHEDULE SCOPE.  PLEASE REACH OUT TO RESCHEDULE. THANK YOU

## 2023-10-23 ENCOUNTER — OFFICE VISIT (OUTPATIENT)
Age: 68
End: 2023-10-23
Payer: COMMERCIAL

## 2023-10-23 VITALS
WEIGHT: 256 LBS | DIASTOLIC BLOOD PRESSURE: 80 MMHG | BODY MASS INDEX: 33.93 KG/M2 | HEIGHT: 73 IN | SYSTOLIC BLOOD PRESSURE: 126 MMHG | HEART RATE: 58 BPM

## 2023-10-23 DIAGNOSIS — I48.0 PAROXYSMAL ATRIAL FIBRILLATION: Primary | ICD-10-CM

## 2023-10-23 PROCEDURE — 99214 OFFICE O/P EST MOD 30 MIN: CPT | Performed by: INTERNAL MEDICINE

## 2023-10-23 PROCEDURE — 93000 ELECTROCARDIOGRAM COMPLETE: CPT | Performed by: INTERNAL MEDICINE

## 2023-10-23 RX ORDER — METOPROLOL SUCCINATE 50 MG/1
25 TABLET, EXTENDED RELEASE ORAL 2 TIMES DAILY
Start: 2023-10-23

## 2023-10-23 NOTE — PROGRESS NOTES
Subjective:     Encounter Date: 10/23/23        Patient ID: Darek Oakley is a 68 y.o. male.    Chief Complaint: New onset atrial fibrillation  HPI:   This is a 68-year-old man who was diagnosed with AF in 2022.   He is an avid runner and was having debilitating tachycardia.  This improved substantially with the addition of Toprol.  We increased Toprol to 25 mg twice daily due to weekly A. fib episodes.  Currently he is only having 1-2  A. fib episodes per month.  Usually they resolve spontaneously. He notices it is worsened by  heat, chocolate, tea/coffee and nuts.     He has no complaints. He continues to run. He decreased his atorva dose and LDL has risen to 122 from 111. Total cholesterol is still less than 200.     The following portions of the patient's history were reviewed and updated as appropriate: allergies, current medications, past family history, past medical history, past social history, past surgical history and problem list.     REVIEW OF SYSTEMS:   All systems reviewed.  Pertinent positives identified in HPI.  All other systems are negative.    Past Medical History:   Diagnosis Date    Abnormal ECG 2021    Atrial Fibrillation    Allergic     Benign prostatic hyperplasia 2020    Erectile dysfunction     GERD (gastroesophageal reflux disease)     Hyperlipidemia     Stage 3a chronic kidney disease 2021       Family History   Problem Relation Age of Onset    Hyperlipidemia Mother     Cancer Mother         Breast cancer, lung cancer    Hyperlipidemia Father     Hyperlipidemia Sister     Hyperlipidemia Brother     Arrhythmia Sister     Hyperlipidemia Sister        Social History     Socioeconomic History    Marital status:    Tobacco Use    Smoking status: Former     Packs/day: 2.00     Years: 10.00     Additional pack years: 0.00     Total pack years: 20.00     Types: Cigarettes     Start date: 1973     Quit date: 1984     Years since quittin.8    Smokeless  tobacco: Never    Tobacco comments:     Patient quit smoking in the 1980s he says.   Vaping Use    Vaping Use: Never used   Substance and Sexual Activity    Alcohol use: No    Drug use: No    Sexual activity: Yes     Partners: Female     Birth control/protection: Surgical, Post-menopausal, None, Vasectomy     Comment: vasectomy       Allergies   Allergen Reactions    No Known Drug Allergy        Past Surgical History:   Procedure Laterality Date    COLONOSCOPY  2008?     WNL    COLONOSCOPY N/A 5/31/2019    Procedure: COLONOSCOPY into cecum with cold snare polypectomies;  Surgeon: Stuart Scott MD;  Location: [facility] ENDOSCOPY;  Service: Gastroenterology    HERNIA REPAIR      TONSILLECTOMY           ECG 12 Lead    Date/Time: 10/23/2023 12:48 PM  Performed by: Enedina Santa MD    Authorized by: Enedina Santa MD  Comparison: compared with previous ECG from 10/21/2022  Comparison to previous ECG: NSR has replaced AF  Rhythm: sinus rhythm  Rate: normal  Conduction: conduction normal  ST Segments: ST segments normal  T Waves: T waves normal  QRS axis: normal  Other: no other findings    Clinical impression: normal ECG         Objective:       PHYSICAL EXAM:  GEN: VSS, no distress,   Eyes: normal sclera, normal lids and lashes  HENT: moist mucus membranes,   Respiratory: CTAB, no rales or wheezes  CV: RRR, no murmurs, , +2 DP and 2+ carotid pulses b/l  GI: NABS, soft,  Nontender, nondistended  MSK: no edema, no scoliosis or kyphosis  Skin: no rash, warm, dry  Heme/Lymph: no bruising or bleeding  Psych: organized thought, normal behavior and affect  Neuro: Cranial nerves grossly intact, Alert and Oriented x 3.         Assessment:          Diagnosis Plan   1. Paroxysmal atrial fibrillation             Plan:       1.  Paroxysmal atrial fibrillation: Intermittent symptoms which resolve spontaneously, about 1-2 per month. Continue metoprolol 25 BID and Eliquis.   2. Dilated LV, LVH: likely related to athelic heart. No  signs of CHF.   3. HLD: LDL not at goa, 122, on atorva 20. We discussed lifestyle changes,less fried foods. If no improvement would increase back to 40.     Dr. Wallace, thank you very much for referring this kind patient to me. Please call me with any questions or concerns. I will see the patient again in the office in 12 months.         Enedina Santa MD  10/23/23  [facility]    Outpatient Encounter Medications as of 10/23/2023   Medication Sig Dispense Refill    alfuzosin (UROXATRAL) 10 MG 24 hr tablet Take 1 tablet by mouth Daily.      atorvastatin (LIPITOR) 20 MG tablet Take 1 tablet by mouth Every Evening. 90 tablet 4    diphenhydrAMINE (BENADRYL) 25 mg capsule Take 3 capsules by mouth At Night As Needed for Sleep.      Eliquis 5 MG tablet tablet TAKE 1 TABLET BY MOUTH TWICE DAILY 180 tablet 1    metoprolol succinate XL (TOPROL-XL) 50 MG 24 hr tablet Take 1 tablet by mouth 2 (Two) Times a Day. (Patient taking differently: Take 1 tablet by mouth 2 (Two) Times a Day. Pt takes 1/2 tab bid) 180 tablet 3    omeprazole (priLOSEC) 20 MG capsule TAKE 1 CAPSULE BY MOUTH DAILY 90 capsule 0    sildenafil (VIAGRA) 50 MG tablet Take 1 tablet by mouth As Needed for Erectile Dysfunction. 4 tablet 3    [DISCONTINUED] omeprazole (priLOSEC) 20 MG capsule TAKE 1 CAPSULE BY MOUTH DAILY 90 capsule 0     No facility-administered encounter medications on file as of 10/23/2023.

## 2023-11-14 ENCOUNTER — TELEPHONE (OUTPATIENT)
Dept: GASTROENTEROLOGY | Facility: CLINIC | Age: 68
End: 2023-11-14
Payer: COMMERCIAL

## 2023-11-15 ENCOUNTER — TELEPHONE (OUTPATIENT)
Dept: GASTROENTEROLOGY | Facility: CLINIC | Age: 68
End: 2023-11-15

## 2023-11-15 NOTE — TELEPHONE ENCOUNTER
Hub staff attempted to follow warm transfer process and was unsuccessful     Caller: Darek Oakley    Relationship to patient: Self    Best call back number: [phone]     Patient is needing: PT IS RETURNING PHONE CALL TO RESCHEDULE. PT IS FRUSTRATED THAT HE HAS TO RESCHEDULE AGAIN. PLEASE CALL PT BACK

## 2023-11-15 NOTE — TELEPHONE ENCOUNTER
Hub staff attempted to follow warm transfer process and was unsuccessful     Caller: Darek Oakley    Relationship to patient: Self    Best call back number: [phone]     Patient is needing: PT IS RETURNING PHONE CALL TO RESCHEDULE. PT IS FRUSTRATED THAT HE HAS TO RESCHEDULE AGAIN. PLEASE CALL PT BACK

## 2023-11-16 ENCOUNTER — TELEPHONE (OUTPATIENT)
Dept: GASTROENTEROLOGY | Facility: CLINIC | Age: 68
End: 2023-11-16
Payer: COMMERCIAL

## 2023-11-16 NOTE — TELEPHONE ENCOUNTER
Called the patient and reschedule for , COLONOSCOPY on  2/8/2024 arrive at 10;40  . Sent prep instructions to pt mail address ....[email or web address]

## 2023-11-22 DIAGNOSIS — K21.9 GASTROESOPHAGEAL REFLUX DISEASE: ICD-10-CM

## 2023-11-22 DIAGNOSIS — K21.9 GASTROESOPHAGEAL REFLUX DISEASE, UNSPECIFIED WHETHER ESOPHAGITIS PRESENT: ICD-10-CM

## 2023-11-27 RX ORDER — OMEPRAZOLE 20 MG/1
20 CAPSULE, DELAYED RELEASE ORAL DAILY
Qty: 90 CAPSULE | Refills: 0 | Status: SHIPPED | OUTPATIENT
Start: 2023-11-27

## 2023-12-09 DIAGNOSIS — K21.9 GASTROESOPHAGEAL REFLUX DISEASE, UNSPECIFIED WHETHER ESOPHAGITIS PRESENT: ICD-10-CM

## 2023-12-09 DIAGNOSIS — K21.9 GASTROESOPHAGEAL REFLUX DISEASE: ICD-10-CM

## 2023-12-11 RX ORDER — OMEPRAZOLE 20 MG/1
20 CAPSULE, DELAYED RELEASE ORAL DAILY
Qty: 90 CAPSULE | Refills: 0 | Status: SHIPPED | OUTPATIENT
Start: 2023-12-11

## 2023-12-19 ENCOUNTER — TELEPHONE (OUTPATIENT)
Dept: INTERNAL MEDICINE | Facility: CLINIC | Age: 68
End: 2023-12-19
Payer: COMMERCIAL

## 2023-12-19 NOTE — TELEPHONE ENCOUNTER
Caller: Darek Oakley    Relationship to patient: Self    Best call back number: [phone]     Date of positive COVID19 test: 12/18/23    Date of possible COVID19 exposure: 4 OR 5 DAYS     COVID19 symptoms: ACHES AND PAINS, SORE THROAT, FATIGUE AND A BAD COUGH     Date of initial quarantine: 12/17/23    Additional information or concerns: PATIENT STATES HIS WIFE WAS DIAGNOSED WITH COVID 12/17/23    What is the patients preferred pharmacy: [id number] [address] [place] [address] AT [facility] & [facility] [phone] [facility] [phone]

## 2023-12-20 ENCOUNTER — TELEMEDICINE (OUTPATIENT)
Dept: INTERNAL MEDICINE | Facility: CLINIC | Age: 68
End: 2023-12-20
Payer: COMMERCIAL

## 2023-12-20 DIAGNOSIS — U07.1 COVID-19: Primary | ICD-10-CM

## 2023-12-20 RX ORDER — BENZONATATE 100 MG/1
100 CAPSULE ORAL 3 TIMES DAILY PRN
Qty: 30 CAPSULE | Refills: 0 | Status: SHIPPED | OUTPATIENT
Start: 2023-12-20

## 2023-12-20 NOTE — PROGRESS NOTES
"Chief Complaint  Covid 19 at home monitoring video visit    Subjective        Darek Oakley presents to [facility]  History of Present Illness  Patient agreed to telehealth via video phone call. He appears stable, alert and oriented and verbal consent given to treat over the video call.   Patient tested positive for Covid on 12/18/23, symptoms include dry hacking cough, muscle aches, sore throat, fatigue, low grade fever. He has treated fever with OTC Ibuprofen successfully. Denies shortness of breath, taking OTC decongestant. History of Afib, feeling of flutters with dry cough, patient on current medications and blood thinner daily. His wife also tested positive on 12/17/23 admitted to hospital  and discharged home.         Objective   Vital Signs:  There were no vitals taken for this visit.  Estimated body mass index is 33.78 kg/m² as calculated from the following:    Height as of 10/23/23: 185.4 cm (73\").    Weight as of 10/23/23: 116 kg (256 lb).             Physical Exam  Eyes:      General: Allergic shiner present.   Pulmonary:      Effort: Pulmonary effort is normal. No tachypnea, bradypnea or respiratory distress.   Neurological:      General: No focal deficit present.      Mental Status: He is alert and oriented to person, place, and time. He is lethargic.   Psychiatric:         Attention and Perception: Attention normal.         Mood and Affect: Mood normal.         Speech: Speech normal.         Behavior: Behavior normal.         Thought Content: Thought content normal.         Judgment: Judgment normal.        Result Review :                   Assessment and Plan   Diagnoses and all orders for this visit:    1. COVID-19 (Primary)  -     benzonatate (Tessalon Perles) 100 MG capsule; Take 1 capsule by mouth 3 (Three) Times a Day As Needed for Cough.  Dispense: 30 capsule; Refill: 0    Continue with OTC Ibuprofen as needed for Headache, fever or muscle aches. Hydrate with " plenty of water and continue with home isolation for recommended 5 days since originally testing positive (12/18/23). If symptoms resolve after 5 days and no fevers without Ibuprofen for 48 hours may resume normal activities.          Follow Up   Return if symptoms worsen or fail to improve.  Patient was given instructions and counseling regarding his condition or for health maintenance advice. Please see specific information pulled into the AVS if appropriate.

## 2024-01-04 ENCOUNTER — TELEPHONE (OUTPATIENT)
Dept: CARDIOLOGY | Facility: CLINIC | Age: 69
End: 2024-01-04

## 2024-01-04 NOTE — TELEPHONE ENCOUNTER
Caller: Darek Oakley    Relationship to patient: Self    Best call back number:  [phone]    Patient is needing: PATIENT HAS BEEN IN AFIB SINCE 12.18.23, PATIENT REQUESTING TO COME IN AND SEE DR ADAIR NEXT WEEK.

## 2024-01-11 ENCOUNTER — OFFICE VISIT (OUTPATIENT)
Age: 69
End: 2024-01-11
Payer: COMMERCIAL

## 2024-01-11 VITALS
WEIGHT: 260 LBS | OXYGEN SATURATION: 96 % | BODY MASS INDEX: 51.04 KG/M2 | DIASTOLIC BLOOD PRESSURE: 72 MMHG | HEART RATE: 64 BPM | SYSTOLIC BLOOD PRESSURE: 128 MMHG | HEIGHT: 60 IN

## 2024-01-11 DIAGNOSIS — I48.0 PAROXYSMAL ATRIAL FIBRILLATION: Primary | ICD-10-CM

## 2024-01-11 PROCEDURE — 99213 OFFICE O/P EST LOW 20 MIN: CPT | Performed by: INTERNAL MEDICINE

## 2024-01-11 NOTE — PROGRESS NOTES
Subjective:     Encounter Date: 01/11/24        Patient ID: Darek Oakley is a 68 y.o. male.    Chief Complaint: New onset atrial fibrillation  HPI:   This is a 68-year-old man who was diagnosed with AF in February 2022.   He is an avid runner and was having debilitating tachycardia.  This improved substantially with the addition of Toprol.  We increased Toprol to 25 mg twice daily due to weekly A. fib episodes.      When I last saw him he was having  1-2  A. fib episodes per month, usually resolving spontaneously.  However in December he developed COVID and had about 3 weeks of A-fib.  He was unable to break it with higher doses of metoprolol.  He did not contact our office.  He continues to be on Eliquis and has been back in normal rhythm for about the last week.  He is now interested in rhythm management options.  He wants to see an electrophysiologist at [facility] that a friend referred him to.    The following portions of the patient's history were reviewed and updated as appropriate: allergies, current medications, past family history, past medical history, past social history, past surgical history and problem list.     REVIEW OF SYSTEMS:   All systems reviewed.  Pertinent positives identified in HPI.  All other systems are negative.    Past Medical History:   Diagnosis Date    Abnormal ECG March 2021    Atrial Fibrillation    Allergic     Benign prostatic hyperplasia 2020    Erectile dysfunction     GERD (gastroesophageal reflux disease)     Hyperlipidemia     Stage 3a chronic kidney disease 04/13/2021       Family History   Problem Relation Age of Onset    Hyperlipidemia Mother     Cancer Mother         Breast cancer, lung cancer    Hyperlipidemia Father     Hyperlipidemia Sister     Hyperlipidemia Brother     Arrhythmia Sister     Hyperlipidemia Sister        Social History     Socioeconomic History    Marital status:    Tobacco Use    Smoking status: Former     Packs/day: 2.00     Years: 10.00      Additional pack years: 0.00     Total pack years: 20.00     Types: Cigarettes     Start date: 1973     Quit date: 1984     Years since quittin.0    Smokeless tobacco: Never    Tobacco comments:     Patient quit smoking in the 1980s he says.   Vaping Use    Vaping Use: Never used   Substance and Sexual Activity    Alcohol use: No    Drug use: No    Sexual activity: Yes     Partners: Female     Birth control/protection: Surgical, Post-menopausal, None, Vasectomy     Comment: vasectomy       Allergies   Allergen Reactions    No Known Drug Allergy        Past Surgical History:   Procedure Laterality Date    COLONOSCOPY  ?     WNL    COLONOSCOPY N/A 2019    Procedure: COLONOSCOPY into cecum with cold snare polypectomies;  Surgeon: Stuart Scott MD;  Location: [facility] ENDOSCOPY;  Service: Gastroenterology    HERNIA REPAIR      TONSILLECTOMY         Procedures     Objective:       PHYSICAL EXAM:  GEN: VSS, no distress,   Eyes: normal sclera, normal lids and lashes  HENT: moist mucus membranes,   Respiratory: CTAB, no rales or wheezes  CV: RRR, no murmurs, , +2 DP and 2+ carotid pulses b/l  GI: NABS, soft,  Nontender, nondistended  MSK: no edema, no scoliosis or kyphosis  Skin: no rash, warm, dry  Heme/Lymph: no bruising or bleeding  Psych: organized thought, normal behavior and affect  Neuro: Cranial nerves grossly intact, Alert and Oriented x 3.         Assessment:          Diagnosis Plan   1. Paroxysmal atrial fibrillation               Plan:       1.  Paroxysmal atrial fibrillation: Atrial fibrillation has been overall stable but worsened understandably when sick with COVID.  He is interested in rhythm control management.  He was to see a physician at [facility] which is fine with me.  We discussed medical therapy, he is limited by his underlying CKD.  No recent stress test.  I offered to arrange a stress test for him as this may help medication decisions when he sees EP, however he prefers to hold  off for now.  Continue metoprolol 25 twice daily and Eliquis..   2. Dilated LV, LVH: likely related to athelic heart. No signs of CHF.     Dr. Wallace, thank you very much for referring this kind patient to me. Please call me with any questions or concerns. I will see the patient again in the office in 12 months.         Enedina Santa MD  01/11/24  [facility]    Outpatient Encounter Medications as of 1/11/2024   Medication Sig Dispense Refill    atorvastatin (LIPITOR) 20 MG tablet Take 1 tablet by mouth Every Evening. 90 tablet 4    benzonatate (Tessalon Perles) 100 MG capsule Take 1 capsule by mouth 3 (Three) Times a Day As Needed for Cough. 30 capsule 0    diphenhydrAMINE (BENADRYL) 25 mg capsule Take 3 capsules by mouth At Night As Needed for Sleep.      Eliquis 5 MG tablet tablet TAKE 1 TABLET BY MOUTH TWICE DAILY 180 tablet 1    metoprolol succinate XL (TOPROL-XL) 50 MG 24 hr tablet Take 0.5 tablets by mouth 2 (Two) Times a Day. Pt takes 1/2 tab bid      omeprazole (priLOSEC) 20 MG capsule TAKE 1 CAPSULE BY MOUTH DAILY 90 capsule 0    sildenafil (VIAGRA) 50 MG tablet Take 1 tablet by mouth As Needed for Erectile Dysfunction. 4 tablet 3    alfuzosin (UROXATRAL) 10 MG 24 hr tablet Take 1 tablet by mouth Daily.       No facility-administered encounter medications on file as of 1/11/2024.

## 2024-01-12 ENCOUNTER — TELEPHONE (OUTPATIENT)
Dept: CARDIOLOGY | Facility: CLINIC | Age: 69
End: 2024-01-12
Payer: COMMERCIAL

## 2024-01-12 NOTE — TELEPHONE ENCOUNTER
Caller: Darek Oakley    Relationship to patient: Self    Best call back number: [phone]    Patient is needing: PT STATES HE AND DR. ADAIR DISCUSS HIM SEEING AN ELECTROPHYSIOLOGIST, SO HE HIS NEEDING A REFERRAL SENT . HE WANTS TO SEE DR. CARMEN RAMIRES WITH [facility].     [phone]

## 2024-01-15 DIAGNOSIS — I48.0 AF (PAROXYSMAL ATRIAL FIBRILLATION): Primary | ICD-10-CM

## 2024-02-05 RX ORDER — APIXABAN 5 MG/1
TABLET, FILM COATED ORAL
Qty: 180 TABLET | Refills: 1 | Status: SHIPPED | OUTPATIENT
Start: 2024-02-05

## 2024-02-12 ENCOUNTER — TELEPHONE (OUTPATIENT)
Dept: GASTROENTEROLOGY | Facility: CLINIC | Age: 69
End: 2024-02-12
Payer: COMMERCIAL

## 2024-02-12 DIAGNOSIS — Z86.010 PERSONAL HISTORY OF COLONIC POLYPS: Primary | ICD-10-CM

## 2024-02-12 RX ORDER — SODIUM CHLORIDE, SODIUM LACTATE, POTASSIUM CHLORIDE, CALCIUM CHLORIDE 600; 310; 30; 20 MG/100ML; MG/100ML; MG/100ML; MG/100ML
30 INJECTION, SOLUTION INTRAVENOUS CONTINUOUS
OUTPATIENT
Start: 2024-02-12

## 2024-02-13 ENCOUNTER — TELEPHONE (OUTPATIENT)
Dept: GASTROENTEROLOGY | Facility: CLINIC | Age: 69
End: 2024-02-13
Payer: COMMERCIAL

## 2024-02-14 ENCOUNTER — TELEPHONE (OUTPATIENT)
Dept: GASTROENTEROLOGY | Facility: CLINIC | Age: 69
End: 2024-02-14
Payer: COMMERCIAL

## 2024-02-14 PROBLEM — Z86.0100 PERSONAL HISTORY OF COLONIC POLYPS: Status: ACTIVE | Noted: 2024-02-12

## 2024-02-14 PROBLEM — Z86.010 PERSONAL HISTORY OF COLONIC POLYPS: Status: ACTIVE | Noted: 2024-02-12

## 2024-02-14 NOTE — TELEPHONE ENCOUNTER
Hub staff attempted to follow warm transfer process and was unsuccessful      Caller: Darek Oakley     Relationship to patient: Self     Best call back number: [phone]     Patient is needing: RETURNING MISSED CALL TO SCHEDULE SCOPE. BEST TIME TO REACH PT IS BETWEEN 2 AND 4P DAILY. PLEASE CONTACT PT DURING THIS TIME TO SCHEDULE

## 2024-03-18 RX ORDER — FLECAINIDE ACETATE 50 MG/1
1 TABLET ORAL EVERY 12 HOURS SCHEDULED
COMMUNITY
Start: 2024-02-08

## 2024-03-19 ENCOUNTER — ANESTHESIA (OUTPATIENT)
Dept: GASTROENTEROLOGY | Facility: HOSPITAL | Age: 69
End: 2024-03-19
Payer: COMMERCIAL

## 2024-03-19 ENCOUNTER — HOSPITAL ENCOUNTER (OUTPATIENT)
Facility: HOSPITAL | Age: 69
Setting detail: HOSPITAL OUTPATIENT SURGERY
Discharge: HOME | End: 2024-03-19
Attending: INTERNAL MEDICINE | Admitting: INTERNAL MEDICINE
Payer: COMMERCIAL

## 2024-03-19 ENCOUNTER — ANESTHESIA EVENT (OUTPATIENT)
Dept: GASTROENTEROLOGY | Facility: HOSPITAL | Age: 69
End: 2024-03-19
Payer: COMMERCIAL

## 2024-03-19 VITALS
HEART RATE: 65 BPM | OXYGEN SATURATION: 98 % | HEIGHT: 73 IN | DIASTOLIC BLOOD PRESSURE: 74 MMHG | SYSTOLIC BLOOD PRESSURE: 128 MMHG | WEIGHT: 254.3 LBS | BODY MASS INDEX: 33.7 KG/M2 | RESPIRATION RATE: 16 BRPM

## 2024-03-19 DIAGNOSIS — Z86.010 PERSONAL HISTORY OF COLONIC POLYPS: ICD-10-CM

## 2024-03-19 PROCEDURE — 88341 IMHCHEM/IMCYTCHM EA ADD ANTB: CPT | Performed by: INTERNAL MEDICINE

## 2024-03-19 PROCEDURE — 88305 TISSUE EXAM BY PATHOLOGIST: CPT | Performed by: INTERNAL MEDICINE

## 2024-03-19 PROCEDURE — 45380 COLONOSCOPY AND BIOPSY: CPT | Performed by: INTERNAL MEDICINE

## 2024-03-19 PROCEDURE — 25010000002 PROPOFOL 10 MG/ML EMULSION: Performed by: NURSE ANESTHETIST, CERTIFIED REGISTERED

## 2024-03-19 PROCEDURE — 25810000003 LACTATED RINGERS PER 1000 ML: Performed by: INTERNAL MEDICINE

## 2024-03-19 PROCEDURE — 45385 COLONOSCOPY W/LESION REMOVAL: CPT | Performed by: INTERNAL MEDICINE

## 2024-03-19 PROCEDURE — 88342 IMHCHEM/IMCYTCHM 1ST ANTB: CPT | Performed by: INTERNAL MEDICINE

## 2024-03-19 RX ORDER — FENTANYL CITRATE 50 UG/ML
25 INJECTION, SOLUTION INTRAMUSCULAR; INTRAVENOUS
Status: DISCONTINUED | OUTPATIENT
Start: 2024-03-19 | End: 2024-03-19 | Stop reason: HOSPADM

## 2024-03-19 RX ORDER — FLUMAZENIL 0.1 MG/ML
0.2 INJECTION INTRAVENOUS AS NEEDED
Status: DISCONTINUED | OUTPATIENT
Start: 2024-03-19 | End: 2024-03-19 | Stop reason: HOSPADM

## 2024-03-19 RX ORDER — PROMETHAZINE HYDROCHLORIDE 25 MG/1
25 SUPPOSITORY RECTAL ONCE AS NEEDED
Status: DISCONTINUED | OUTPATIENT
Start: 2024-03-19 | End: 2024-03-19 | Stop reason: HOSPADM

## 2024-03-19 RX ORDER — EPHEDRINE SULFATE 50 MG/ML
5 INJECTION, SOLUTION INTRAVENOUS ONCE AS NEEDED
Status: DISCONTINUED | OUTPATIENT
Start: 2024-03-19 | End: 2024-03-19 | Stop reason: HOSPADM

## 2024-03-19 RX ORDER — ONDANSETRON 2 MG/ML
4 INJECTION INTRAMUSCULAR; INTRAVENOUS ONCE AS NEEDED
Status: DISCONTINUED | OUTPATIENT
Start: 2024-03-19 | End: 2024-03-19 | Stop reason: HOSPADM

## 2024-03-19 RX ORDER — DROPERIDOL 2.5 MG/ML
0.62 INJECTION, SOLUTION INTRAMUSCULAR; INTRAVENOUS
Status: DISCONTINUED | OUTPATIENT
Start: 2024-03-19 | End: 2024-03-19 | Stop reason: HOSPADM

## 2024-03-19 RX ORDER — HYDRALAZINE HYDROCHLORIDE 20 MG/ML
5 INJECTION INTRAMUSCULAR; INTRAVENOUS
Status: DISCONTINUED | OUTPATIENT
Start: 2024-03-19 | End: 2024-03-19 | Stop reason: HOSPADM

## 2024-03-19 RX ORDER — NALOXONE HCL 0.4 MG/ML
0.2 VIAL (ML) INJECTION AS NEEDED
Status: DISCONTINUED | OUTPATIENT
Start: 2024-03-19 | End: 2024-03-19 | Stop reason: HOSPADM

## 2024-03-19 RX ORDER — SODIUM CHLORIDE, SODIUM LACTATE, POTASSIUM CHLORIDE, CALCIUM CHLORIDE 600; 310; 30; 20 MG/100ML; MG/100ML; MG/100ML; MG/100ML
30 INJECTION, SOLUTION INTRAVENOUS CONTINUOUS PRN
Status: DISCONTINUED | OUTPATIENT
Start: 2024-03-19 | End: 2024-03-19 | Stop reason: HOSPADM

## 2024-03-19 RX ORDER — HYDROCODONE BITARTRATE AND ACETAMINOPHEN 5; 325 MG/1; MG/1
1 TABLET ORAL ONCE AS NEEDED
Status: DISCONTINUED | OUTPATIENT
Start: 2024-03-19 | End: 2024-03-19 | Stop reason: HOSPADM

## 2024-03-19 RX ORDER — DIPHENHYDRAMINE HYDROCHLORIDE 50 MG/ML
12.5 INJECTION INTRAMUSCULAR; INTRAVENOUS
Status: DISCONTINUED | OUTPATIENT
Start: 2024-03-19 | End: 2024-03-19 | Stop reason: HOSPADM

## 2024-03-19 RX ORDER — SODIUM CHLORIDE, SODIUM LACTATE, POTASSIUM CHLORIDE, CALCIUM CHLORIDE 600; 310; 30; 20 MG/100ML; MG/100ML; MG/100ML; MG/100ML
30 INJECTION, SOLUTION INTRAVENOUS CONTINUOUS
Status: DISCONTINUED | OUTPATIENT
Start: 2024-03-19 | End: 2024-03-19 | Stop reason: HOSPADM

## 2024-03-19 RX ORDER — LABETALOL HYDROCHLORIDE 5 MG/ML
5 INJECTION, SOLUTION INTRAVENOUS
Status: DISCONTINUED | OUTPATIENT
Start: 2024-03-19 | End: 2024-03-19 | Stop reason: HOSPADM

## 2024-03-19 RX ORDER — PROPOFOL 10 MG/ML
VIAL (ML) INTRAVENOUS AS NEEDED
Status: DISCONTINUED | OUTPATIENT
Start: 2024-03-19 | End: 2024-03-19 | Stop reason: SURG

## 2024-03-19 RX ORDER — SODIUM CHLORIDE 9 MG/ML
40 INJECTION, SOLUTION INTRAVENOUS AS NEEDED
Status: DISCONTINUED | OUTPATIENT
Start: 2024-03-19 | End: 2024-03-19 | Stop reason: HOSPADM

## 2024-03-19 RX ORDER — SODIUM CHLORIDE 0.9 % (FLUSH) 0.9 %
10 SYRINGE (ML) INJECTION AS NEEDED
Status: DISCONTINUED | OUTPATIENT
Start: 2024-03-19 | End: 2024-03-19 | Stop reason: HOSPADM

## 2024-03-19 RX ORDER — SODIUM CHLORIDE 0.9 % (FLUSH) 0.9 %
10 SYRINGE (ML) INJECTION EVERY 12 HOURS SCHEDULED
Status: DISCONTINUED | OUTPATIENT
Start: 2024-03-19 | End: 2024-03-19 | Stop reason: HOSPADM

## 2024-03-19 RX ORDER — LIDOCAINE HYDROCHLORIDE 20 MG/ML
INJECTION, SOLUTION INFILTRATION; PERINEURAL AS NEEDED
Status: DISCONTINUED | OUTPATIENT
Start: 2024-03-19 | End: 2024-03-19 | Stop reason: SURG

## 2024-03-19 RX ORDER — PROMETHAZINE HYDROCHLORIDE 25 MG/1
25 TABLET ORAL ONCE AS NEEDED
Status: DISCONTINUED | OUTPATIENT
Start: 2024-03-19 | End: 2024-03-19 | Stop reason: HOSPADM

## 2024-03-19 RX ORDER — HYDROCODONE BITARTRATE AND ACETAMINOPHEN 7.5; 325 MG/1; MG/1
1 TABLET ORAL EVERY 4 HOURS PRN
Status: DISCONTINUED | OUTPATIENT
Start: 2024-03-19 | End: 2024-03-19 | Stop reason: HOSPADM

## 2024-03-19 RX ORDER — HYDROMORPHONE HYDROCHLORIDE 2 MG/ML
0.25 INJECTION, SOLUTION INTRAMUSCULAR; INTRAVENOUS; SUBCUTANEOUS
Status: DISCONTINUED | OUTPATIENT
Start: 2024-03-19 | End: 2024-03-19 | Stop reason: HOSPADM

## 2024-03-19 RX ORDER — IPRATROPIUM BROMIDE AND ALBUTEROL SULFATE 2.5; .5 MG/3ML; MG/3ML
3 SOLUTION RESPIRATORY (INHALATION) ONCE AS NEEDED
Status: DISCONTINUED | OUTPATIENT
Start: 2024-03-19 | End: 2024-03-19 | Stop reason: HOSPADM

## 2024-03-19 RX ADMIN — PROPOFOL 70 MG: 10 INJECTION, EMULSION INTRAVENOUS at 10:05

## 2024-03-19 RX ADMIN — PROPOFOL 200 MCG/KG/MIN: 10 INJECTION, EMULSION INTRAVENOUS at 10:03

## 2024-03-19 RX ADMIN — SODIUM CHLORIDE, POTASSIUM CHLORIDE, SODIUM LACTATE AND CALCIUM CHLORIDE 30 ML/HR: 600; 310; 30; 20 INJECTION, SOLUTION INTRAVENOUS at 09:50

## 2024-03-19 RX ADMIN — SODIUM CHLORIDE, POTASSIUM CHLORIDE, SODIUM LACTATE AND CALCIUM CHLORIDE: 600; 310; 30; 20 INJECTION, SOLUTION INTRAVENOUS at 10:05

## 2024-03-19 RX ADMIN — LIDOCAINE HYDROCHLORIDE 60 MG: 20 INJECTION, SOLUTION INFILTRATION; PERINEURAL at 10:05

## 2024-03-19 NOTE — SIGNIFICANT NOTE
Patient unable to pass air. Patient ambulated around unit. Dr. Beckham aware. Patient returned to room and placed on all 4s. Patient started passing air.

## 2024-03-19 NOTE — ANESTHESIA PREPROCEDURE EVALUATION
Anesthesia Evaluation     Patient summary reviewed   NPO Solid Status: > 8 hours             Airway   Mallampati: II  TM distance: >3 FB  Neck ROM: full  No difficulty expected  Dental      Pulmonary    Cardiovascular     Rate: normal    (+) dysrhythmias Paroxysmal Atrial Fib, hyperlipidemia      Neuro/Psych  GI/Hepatic/Renal/Endo    (+) obesity, GERD, renal disease- CRI    Musculoskeletal     Abdominal   (+) obese   Substance History      OB/GYN          Other                    Anesthesia Plan    ASA 3     MAC     intravenous induction     Anesthetic plan, risks, benefits, and alternatives have been provided, discussed and informed consent has been obtained with: patient.    CODE STATUS:

## 2024-03-19 NOTE — ANESTHESIA POSTPROCEDURE EVALUATION
Patient: Darek Oakley    Procedure Summary       Date: 03/19/24 Room / Location: [facility] ENDOSCOPY 10 / [facility] ENDOSCOPY    Anesthesia Start: 0959 Anesthesia Stop: 1030    Procedure: COLONOSCOPY INTO CECUM WITH COLD SNARE & BIOPSY POLYPECTOMIES Diagnosis:       Personal history of colonic polyps      (Personal history of colonic polyps [Z86.010])    Surgeons: Martín Beckham MD Provider: Evan Rubio MD    Anesthesia Type: MAC ASA Status: 3            Anesthesia Type: MAC    Vitals  Vitals Value Taken Time   BP 99/64 03/19/24 1038   Temp     Pulse 96 03/19/24 1040   Resp 16 03/19/24 1027   SpO2 94 % 03/19/24 1040   Vitals shown include unfiled device data.        Post Anesthesia Care and Evaluation    Patient location during evaluation: PACU  Patient participation: complete - patient participated  Level of consciousness: awake and alert  Pain management: adequate    Airway patency: patent  Anesthetic complications: No anesthetic complications    Cardiovascular status: acceptable  Respiratory status: acceptable  Hydration status: acceptable    Comments: --------------------            03/19/24               1027     --------------------   BP:       102/59     Pulse:      57       Resp:       16       SpO2:      98%      --------------------

## 2024-03-19 NOTE — SIGNIFICANT NOTE
Patients discomfort is improving. He was able to pass a lot of air. He feels like he is ready to go home.

## 2024-03-19 NOTE — H&P
[facility]  Pre Procedure History & Physical    Chief Complaint:   Time for my colonoscopy    Subjective     HPI:   68 y.o. male presenting to endoscopy unit today for surveillance colonoscopy.    Past Medical History:   Past Medical History:   Diagnosis Date    Abnormal ECG March 2021    Atrial Fibrillation    Allergic     Benign prostatic hyperplasia 2020    Erectile dysfunction     GERD (gastroesophageal reflux disease)     Hyperlipidemia     Stage 3a chronic kidney disease 04/13/2021       Family History:  Family History   Problem Relation Age of Onset    Hyperlipidemia Mother     Cancer Mother         Breast cancer, lung cancer    Hyperlipidemia Father     Hyperlipidemia Sister     Arrhythmia Sister     Hyperlipidemia Sister     Hyperlipidemia Brother     Malig Hyperthermia Neg Hx        Social History:   reports that he quit smoking about 40 years ago. His smoking use included cigarettes. He started smoking about 51 years ago. He has a 22 pack-year smoking history. He has never used smokeless tobacco. He reports that he does not drink alcohol and does not use drugs.    Medications:   Medications Prior to Admission   Medication Sig Dispense Refill Last Dose    atorvastatin (LIPITOR) 20 MG tablet Take 1 tablet by mouth Every Evening. 90 tablet 4     diphenhydrAMINE (BENADRYL) 25 mg capsule Take 3 capsules by mouth At Night As Needed for Sleep.       flecainide (TAMBOCOR) 50 MG tablet Take 1 tablet by mouth Every 12 (Twelve) Hours.       metoprolol succinate XL (TOPROL-XL) 50 MG 24 hr tablet Take 0.5 tablets by mouth 2 (Two) Times a Day. Pt takes 1/2 tab bid       omeprazole (priLOSEC) 20 MG capsule TAKE 1 CAPSULE BY MOUTH DAILY 90 capsule 0     sildenafil (VIAGRA) 50 MG tablet Take 1 tablet by mouth As Needed for Erectile Dysfunction. 4 tablet 3     alfuzosin (UROXATRAL) 10 MG 24 hr tablet Take 1 tablet by mouth Daily.       benzonatate (Tessalon Perles) 100 MG capsule Take 1 capsule by  mouth 3 (Three) Times a Day As Needed for Cough. 30 capsule 0     Eliquis 5 MG tablet tablet TAKE 1 TABLET BY MOUTH TWICE DAILY (Patient taking differently: Hold 48hr prior to colonoscopy) 180 tablet 1        Allergies:  No known drug allergy    Objective     There were no vitals taken for this visit.  Physical Exam:   General: patient awake, alert and cooperative    Assessment & Plan     Diagnosis:  Personal hx of colon polyps    Anticipated Surgical Procedure:  Colonoscopy    The risks, benefits, and alternatives of this procedure have been discussed with the patient or the responsible party- the patient understands and agrees to proceed.

## 2024-03-19 NOTE — DISCHARGE INSTRUCTIONS
For the next 24 hours patient needs to be with a responsible adult.    For 24 hours DO NOT drive, operate machinery, appliances, drink alcohol, make important decisions or sign legal documents.    Start with a light or bland diet if you are feeling sick to your stomach otherwise advance to regular diet as tolerated.    Follow recommendations on procedure report if provided by your doctor.    Call Dr. Beckham for problems [phone]    Problems may include but not limited to: large amounts of bleeding, trouble breathing, repeated vomiting, severe unrelieved pain, fever or chills.

## 2024-03-21 LAB
LAB AP CASE REPORT: NORMAL
LAB AP DIAGNOSIS COMMENT: NORMAL
LAB AP SPECIAL STAINS: NORMAL
PATH REPORT.FINAL DX SPEC: NORMAL
PATH REPORT.GROSS SPEC: NORMAL

## 2024-04-03 ENCOUNTER — TELEPHONE (OUTPATIENT)
Dept: GASTROENTEROLOGY | Facility: CLINIC | Age: 69
End: 2024-04-03
Payer: COMMERCIAL

## 2024-04-03 NOTE — TELEPHONE ENCOUNTER
Hub staff attempted to follow warm transfer process and was unsuccessful     Caller: Darek Oakley    Relationship to patient: Self    Best call back number: [phone]    Patient is needing: PATIENT CALLED IN, HE HAD HIS COLONOSCOPY DONE ON 3/19/24 AND ON MYCHART IT STATED TO CONTACT THE OFFICE TO GO OVER RESULTS IF HE HADN'T BEEN CONTACTED WITHIN 2 WEEKS OF THE PROCEDURE BEING DONE. THE PATIENT WOULD LIKE A CALL BACK PLEASE SO SOMEONE CAN GO OVER RESULTS WITH HIM. IF YOU CAN'T REACH HIM PLEASE LEAVE A VMAIL.

## 2024-04-05 NOTE — TELEPHONE ENCOUNTER
Call to patient and left voice mail for them to call office at [phone] option 1 for results        Per Dr. Beckham:  Precancerous and benign polyp  Recall 3 years

## 2024-04-10 ENCOUNTER — TELEPHONE (OUTPATIENT)
Dept: GASTROENTEROLOGY | Facility: CLINIC | Age: 69
End: 2024-04-10
Payer: COMMERCIAL

## 2024-04-10 NOTE — TELEPHONE ENCOUNTER
Caller: Darek Oakley    Relationship: Self    Best call back number: [phone]    What is the best time to reach you: ANYTIME    Who are you requesting to speak with (clinical staff, provider,  specific staff member): CLINICAL STAFF        What was the call regarding: PT IS NEEDING TEST RESULTS. PT MISSED A CALL FROM [facility] WITH THOSE RESULTS

## 2024-04-22 ENCOUNTER — TELEPHONE (OUTPATIENT)
Dept: GASTROENTEROLOGY | Facility: CLINIC | Age: 69
End: 2024-04-22
Payer: COMMERCIAL

## 2024-04-22 NOTE — TELEPHONE ENCOUNTER
Pt reviewed results via [employer].     Sent pt [employer] msg advising of results and recommendations. Advised to call if any questions.     Colonoscopy placed in  and recall for 3/19/2027.

## 2024-04-22 NOTE — TELEPHONE ENCOUNTER
----- Message from Martín Beckham MD sent at 4/18/2024  3:19 PM EDT -----  1 benign polyp and 1 precancerous polyp without any advanced precancerous changes.  Recall 3 years

## 2024-05-05 DIAGNOSIS — K21.9 GASTROESOPHAGEAL REFLUX DISEASE: ICD-10-CM

## 2024-05-05 DIAGNOSIS — K21.9 GASTROESOPHAGEAL REFLUX DISEASE, UNSPECIFIED WHETHER ESOPHAGITIS PRESENT: ICD-10-CM

## 2024-05-06 DIAGNOSIS — N18.31 STAGE 3A CHRONIC KIDNEY DISEASE: ICD-10-CM

## 2024-05-06 DIAGNOSIS — E78.2 MIXED HYPERLIPIDEMIA: Chronic | ICD-10-CM

## 2024-05-06 DIAGNOSIS — Z00.00 ENCOUNTER FOR HEALTH MAINTENANCE EXAMINATION: Primary | ICD-10-CM

## 2024-05-06 RX ORDER — OMEPRAZOLE 20 MG/1
20 CAPSULE, DELAYED RELEASE ORAL DAILY
Qty: 90 CAPSULE | Refills: 1 | Status: SHIPPED | OUTPATIENT
Start: 2024-05-06

## 2024-05-19 DIAGNOSIS — E78.2 MIXED HYPERLIPIDEMIA: ICD-10-CM

## 2024-05-24 RX ORDER — ATORVASTATIN CALCIUM 20 MG/1
20 TABLET, FILM COATED ORAL EVERY EVENING
Qty: 90 TABLET | Refills: 0 | Status: SHIPPED | OUTPATIENT
Start: 2024-05-24

## 2024-05-31 ENCOUNTER — OFFICE VISIT (OUTPATIENT)
Dept: INTERNAL MEDICINE | Facility: CLINIC | Age: 69
End: 2024-05-31
Payer: COMMERCIAL

## 2024-05-31 VITALS
OXYGEN SATURATION: 96 % | HEART RATE: 78 BPM | SYSTOLIC BLOOD PRESSURE: 124 MMHG | RESPIRATION RATE: 18 BRPM | HEIGHT: 73 IN | BODY MASS INDEX: 33.66 KG/M2 | WEIGHT: 254 LBS | DIASTOLIC BLOOD PRESSURE: 80 MMHG

## 2024-05-31 DIAGNOSIS — Z00.00 ENCOUNTER FOR HEALTH MAINTENANCE EXAMINATION: Primary | ICD-10-CM

## 2024-05-31 PROCEDURE — 99397 PER PM REEVAL EST PAT 65+ YR: CPT | Performed by: FAMILY MEDICINE

## 2024-05-31 NOTE — PROGRESS NOTES
Chief Complaint  No chief complaint on file.    Subjective    {Problem List  Visit Diagnosis   Encounters  Notes  Medications  Labs  Result Review Imaging  Media :23}    Darek Oakley presents to [facility]  History of Present Illness    Answers submitted by the patient for this visit:  Primary Reason for Visit (Submitted on 5/28/2024)  What is the primary reason for your visit?: Cough  Cough Questionnaire (Submitted on 5/28/2024)  Chief Complaint: Cough  Chronicity: new  Onset: 1 to 4 weeks ago  Progression since onset: improving  Frequency: hourly  Cough characteristics: productive of yellow sputum  chest pain: No  chills: No  ear congestion: No  ear pain: No  fever: No  headaches: No  heartburn: No  hemoptysis: No  myalgias: No  nasal congestion: No  postnasal drip: No  rash: No  rhinorrhea: Yes  shortness of breath: No  sore throat: Yes  sweats: No  weight loss: No  wheezing: No  Aggravated by: pollens  Risk factors for lung disease: smoking/tobacco exposure    I reviewed the information submitted by the patient above for today's visit.      Current Outpatient Medications:     atorvastatin (LIPITOR) 20 MG tablet, TAKE 1 TABLET BY MOUTH EVERY EVENING, Disp: 90 tablet, Rfl: 0    diphenhydrAMINE (BENADRYL) 25 mg capsule, Take 3 capsules by mouth At Night As Needed for Sleep., Disp: , Rfl:     Eliquis 5 MG tablet tablet, TAKE 1 TABLET BY MOUTH TWICE DAILY (Patient taking differently: Hold 48hr prior to colonoscopy), Disp: 180 tablet, Rfl: 1    flecainide (TAMBOCOR) 50 MG tablet, Take 1 tablet by mouth Every 12 (Twelve) Hours., Disp: , Rfl:     metoprolol succinate XL (TOPROL-XL) 50 MG 24 hr tablet, Take 0.5 tablets by mouth 2 (Two) Times a Day. Pt takes 1/2 tab bid, Disp: , Rfl:     omeprazole (priLOSEC) 20 MG capsule, TAKE 1 CAPSULE BY MOUTH DAILY, Disp: 90 capsule, Rfl: 1    sildenafil (VIAGRA) 50 MG tablet, Take 1 tablet by mouth As Needed for Erectile Dysfunction., Disp: 4  "tablet, Rfl: 3      Objective   Vital Signs:  There were no vitals taken for this visit.  Estimated body mass index is 33.55 kg/m² as calculated from the following:    Height as of 3/19/24: 185.4 cm (73\").    Weight as of 3/19/24: 115 kg (254 lb 4.8 oz).       {BMI is >= 30 and <35. (Class 1 Obesity). The following options were offered after discussion; (Optional):54523}      Physical Exam  Vitals and nursing note reviewed.   Constitutional:       General: He is not in acute distress.     Appearance: Normal appearance.   Cardiovascular:      Rate and Rhythm: Normal rate and regular rhythm.      Heart sounds: Normal heart sounds. No murmur heard.  Pulmonary:      Effort: Pulmonary effort is normal.      Breath sounds: Normal breath sounds.   Neurological:      Mental Status: He is alert.        Result Review :{Labs  Result Review  Imaging  Med Tab  Media  Procedures :23}    {The following data was reviewed by (Optional):17185}  {Ambulatory Labs (Optional):62522}  {Data reviewed (Optional):85263:::1}             Assessment and Plan {CC Problem List  Visit Diagnosis   ROS  Review (Popup)  Health Maintenance  Quality  BestPractice  Medications  SmartSets  SnapShot Encounters  Media :23}    There are no diagnoses linked to this encounter.       {Time Spent (Optional):68313}  Follow Up {Instructions Charge Capture  Follow-up Communications :23}    No follow-ups on file.  Patient was given instructions and counseling regarding his condition or for health maintenance advice. Please see specific information pulled into the AVS if appropriate.         "

## 2024-05-31 NOTE — PROGRESS NOTES
"Chief Complaint   Patient presents with    Annual Exam    Cough     3 weeks       Subjective       CPE- Patient is here today for annual physical.  Also complaining of some congestion/cough.  It is somewhat improving.  Ran the mini-marathon and completed.    Labs: completed 5/10/2024    Colorectal cancer screening: [facility]    Vitals:    05/31/24 0810   BP: 124/80   BP Location: Left arm   Patient Position: Sitting   Cuff Size: Small Adult   Pulse: 78   Resp: 18   SpO2: 96%   Weight: 115 kg (254 lb)   Height: 185.4 cm (73\")     Body mass index is 33.51 kg/m².  BMI is >= 30 and <35. (Class 1 Obesity). The following options were offered after discussion;: exercise counseling/recommendations        Physical Exam  Vitals and nursing note reviewed.   Constitutional:       General: He is not in acute distress.     Appearance: Normal appearance.   HENT:      Right Ear: Tympanic membrane, ear canal and external ear normal.      Left Ear: Tympanic membrane, ear canal and external ear normal.      Nose: Nose normal.      Mouth/Throat:      Mouth: Mucous membranes are moist.   Eyes:      General:         Right eye: No discharge.         Left eye: No discharge.      Conjunctiva/sclera: Conjunctivae normal.   Cardiovascular:      Rate and Rhythm: Normal rate and regular rhythm.      Pulses: Normal pulses.      Heart sounds: Normal heart sounds.   Pulmonary:      Effort: Pulmonary effort is normal.      Breath sounds: Normal breath sounds.   Abdominal:      General: Bowel sounds are normal. There is no distension.      Palpations: Abdomen is soft.      Tenderness: There is no abdominal tenderness.   Musculoskeletal:      Cervical back: Neck supple.      Right lower leg: No edema.      Left lower leg: No edema.   Lymphadenopathy:      Cervical: No cervical adenopathy.   Skin:     General: Skin is warm.      Findings: No rash.   Neurological:      General: No focal deficit present.      Mental Status: He is alert. Mental status is at " baseline.   Psychiatric:         Mood and Affect: Mood normal.         Behavior: Behavior normal.           Common labs          5/10/2024    09:25   Common Labs   Glucose 97    BUN 13    Creatinine 1.28    Sodium 139    Potassium 4.8    Chloride 103    Calcium 9.6    Total Protein 6.7    Albumin 4.4    Total Bilirubin 0.5    Alkaline Phosphatase 92    AST (SGOT) 34    ALT (SGPT) 39    WBC 7.06    Hemoglobin 14.0    Hematocrit 43.4    Platelets 254    Total Cholesterol 189    Triglycerides 189    HDL Cholesterol 37    LDL Cholesterol  119    Microalbumin, Urine <3.0            Diagnoses and all orders for this visit:    1. Encounter for health maintenance examination (Primary)       The patient was counseled regarding nutrition, physical activity, healthy weight, injury prevention, misuse of tobacco, alcohol and illicit drugs, mental health, immunizations, and screenings.  I think the cough sounds more related to chronic cough from allergy.  He could try adding some zyrtec.    Return if symptoms worsen or fail to improve.

## 2024-05-31 NOTE — PATIENT INSTRUCTIONS
"MyChart Tips:    Xenoportt can be a useful part of our patient care program and is a way for us to communicate lab results and for you to request refills.  Here is some information regarding the best way to use GnamGnam for communication.       Examples of medical issues that are APPROPRIATE for Xenoportt:  -Follow-up on problems we have already addressed in a visit such as home testing results, blood pressure readings, glucose readings  -Questions that can be answered with a simple \"yes\" or \"no\"  -Please keep communication concise and to the point.  All other types of communication should be held for an office visit.    Communication that is NOT APPROPRIATE for Xenoportt:  -New problems, serious problems or urgent problems.  Urgent matters should be addressed by phone for advice, in the office, urgent care or the ER.  -Requesting Paxlovid or Antibiotics: These types of problems require an evaluation unless your provider approved this previously.    -GnamGnam messages are not email.  Staff will check messages each weekday.  We strive for a 48-hour turnaround on messaging.    -GnamGnam is not for private issues.  Messages are received first by our office staff.    Please be mindful that sometimes it may take longer to receive a response on GnamGnam.  Many times of the year we have high volumes of messages coming into physician inboxes.      Please also be mindful that GnamGnam messages become part of your permanent medical record.    We appreciate your respect for the limitations and boundaries of GnamGnam.      Lab Results:  Please allow up to 7 business days for lab results to be sent through GnamGnam to you before contacting your provider.  Sometimes we are waiting for results to get back from the lab and also your provider needs time to analyze them thoroughly before making recommendations.  Also, providers may be out of the office on vacation.      While the internet has great resources, it is not a substitute for " interpreting lab results.

## 2024-06-03 DIAGNOSIS — E78.2 MIXED HYPERLIPIDEMIA: ICD-10-CM

## 2024-06-03 RX ORDER — ATORVASTATIN CALCIUM 20 MG/1
20 TABLET, FILM COATED ORAL EVERY EVENING
Qty: 90 TABLET | Refills: 0 | OUTPATIENT
Start: 2024-06-03

## 2024-06-05 ENCOUNTER — TELEPHONE (OUTPATIENT)
Dept: INTERNAL MEDICINE | Facility: CLINIC | Age: 69
End: 2024-06-05
Payer: COMMERCIAL

## 2024-06-05 NOTE — TELEPHONE ENCOUNTER
Caller: Darek Oakley    Relationship: Self    Best call back number: [phone]     Requested Prescriptions: atorvastatin (LIPITOR) 20 MG tablet   Requested Prescriptions      No prescriptions requested or ordered in this encounter        Pharmacy where request should be sent:  [id number] - [facility] [address] AT [facility] - [phone] [facility] [phone]  [phone]     Last office visit with prescribing clinician: 5/31/2024   Last telemedicine visit with prescribing clinician: 12/20/2023   Next office visit with prescribing clinician: Visit date not found     Additional details provided by patient: PATIENT IS CALLING TO STATE THE PHARMACY TOLD HIM THAT THEY NEVER RECEIVED THE ABOVE PRESCRIPTION.  HE IS WANTING TO KNOW IF DR MONTIEL WOULD RE SEND THE ABOVE PRESCRIPTION IN TO THE [facility]OVE AGAIN.    Does the patient have less than a 3 day supply:  [x] Yes  [] No    Would you like a call back once the refill request has been completed: [] Yes [] No    If the office needs to give you a call back, can they leave a voicemail: [] Yes [] No    Abisai Wu Rep   06/05/24 14:00 EDT       PLEASE ADVISE.

## 2024-06-06 DIAGNOSIS — E78.2 MIXED HYPERLIPIDEMIA: ICD-10-CM

## 2024-06-06 RX ORDER — ATORVASTATIN CALCIUM 20 MG/1
20 TABLET, FILM COATED ORAL EVERY EVENING
Qty: 90 TABLET | Refills: 0 | OUTPATIENT
Start: 2024-06-06

## 2024-07-23 ENCOUNTER — OFFICE VISIT (OUTPATIENT)
Dept: INTERNAL MEDICINE | Facility: CLINIC | Age: 69
End: 2024-07-23
Payer: COMMERCIAL

## 2024-07-23 VITALS
OXYGEN SATURATION: 96 % | SYSTOLIC BLOOD PRESSURE: 124 MMHG | HEART RATE: 54 BPM | HEIGHT: 73 IN | WEIGHT: 251 LBS | BODY MASS INDEX: 33.27 KG/M2 | RESPIRATION RATE: 18 BRPM | DIASTOLIC BLOOD PRESSURE: 78 MMHG
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
## 2024-07-23 DIAGNOSIS — S00.06XA INSECT BITE OF SCALP, INITIAL ENCOUNTER: Primary | ICD-10-CM

## 2024-07-23 PROCEDURE — 99213 OFFICE O/P EST LOW 20 MIN: CPT | Performed by: FAMILY MEDICINE

## 2024-07-23 RX ORDER — CEPHALEXIN 500 MG/1
500 CAPSULE ORAL 2 TIMES DAILY
Qty: 14 CAPSULE | Refills: 0 | Status: SHIPPED | OUTPATIENT
Start: 2024-07-23 | End: 2024-07-30

## 2024-07-23 NOTE — PROGRESS NOTES
"Chief Complaint  Sore (On head )    Subjective        Darek Oakley presents to [facility]  History of Present Illness    He is here to be seen for a sore area on the top of his head.  His wife noticed it on the top of his scalp.  He works outside quite often which could expose him to multiple insects or spiders.  No fevers or chills but has noticed some pus.  It is not draining today.      Current Outpatient Medications:     atorvastatin (LIPITOR) 20 MG tablet, TAKE 1 TABLET BY MOUTH EVERY EVENING, Disp: 90 tablet, Rfl: 0    Eliquis 5 MG tablet tablet, TAKE 1 TABLET BY MOUTH TWICE DAILY (Patient taking differently: Hold 48hr prior to colonoscopy), Disp: 180 tablet, Rfl: 1    flecainide (TAMBOCOR) 50 MG tablet, Take 1 tablet by mouth Every 12 (Twelve) Hours., Disp: , Rfl:     metoprolol succinate XL (TOPROL-XL) 50 MG 24 hr tablet, Take 0.5 tablets by mouth 2 (Two) Times a Day. Pt takes 1/2 tab bid, Disp: , Rfl:     omeprazole (priLOSEC) 20 MG capsule, TAKE 1 CAPSULE BY MOUTH DAILY, Disp: 90 capsule, Rfl: 1    sildenafil (VIAGRA) 50 MG tablet, Take 1 tablet by mouth As Needed for Erectile Dysfunction., Disp: 4 tablet, Rfl: 3    cephalexin (Keflex) 500 MG capsule, Take 1 capsule by mouth 2 (Two) Times a Day for 7 days., Disp: 14 capsule, Rfl: 0      Objective   Vital Signs:  /78 (BP Location: Left arm, Patient Position: Sitting, Cuff Size: Small Adult)   Pulse 54   Resp 18   Ht 185.4 cm (73\")   Wt 114 kg (251 lb)   SpO2 96%   BMI 33.12 kg/m²   Estimated body mass index is 33.12 kg/m² as calculated from the following:    Height as of this encounter: 185.4 cm (73\").    Weight as of this encounter: 114 kg (251 lb).               Physical Exam  Vitals and nursing note reviewed.   Constitutional:       Appearance: Normal appearance.   HENT:      Head:        Comments: Approximately 1 cm well-circumscribed mildly erythematous bite wound which has some scab present.  No drainage or " surrounding erythema.  No induration.       Result Review :                     Assessment and Plan     Diagnoses and all orders for this visit:    1. Insect bite of scalp, initial encounter (Primary)  -     cephalexin (Keflex) 500 MG capsule; Take 1 capsule by mouth 2 (Two) Times a Day for 7 days.  Dispense: 14 capsule; Refill: 0        It looks like something got him but I am not sure if it was an insect or arachnid.  It looks like a mild infection so I will treat with a 7-day course of Keflex.  I will have his wife take a look at it in about 7 to 10 days and if not improving, I would have him follow back up with us.  It should be improving by then.  Recommended to only care for it with soap and water/shampoo.  No need to scrub or use hydrogen peroxide.         Follow Up     Return if symptoms worsen or fail to improve.  Patient was given instructions and counseling regarding his condition or for health maintenance advice. Please see specific information pulled into the AVS if appropriate.

## 2024-08-26 RX ORDER — METOPROLOL SUCCINATE 50 MG/1
25 TABLET, EXTENDED RELEASE ORAL 2 TIMES DAILY
Qty: 90 TABLET | Refills: 0 | Status: SHIPPED | OUTPATIENT
Start: 2024-08-26

## 2024-09-01 DIAGNOSIS — E78.2 MIXED HYPERLIPIDEMIA: ICD-10-CM

## 2024-09-03 RX ORDER — ATORVASTATIN CALCIUM 20 MG/1
20 TABLET, FILM COATED ORAL EVERY EVENING
Qty: 90 TABLET | Refills: 0 | Status: SHIPPED | OUTPATIENT
Start: 2024-09-03

## 2024-09-04 DIAGNOSIS — K21.9 GASTROESOPHAGEAL REFLUX DISEASE: ICD-10-CM

## 2024-09-04 DIAGNOSIS — K21.9 GASTROESOPHAGEAL REFLUX DISEASE, UNSPECIFIED WHETHER ESOPHAGITIS PRESENT: ICD-10-CM

## 2024-09-04 RX ORDER — APIXABAN 5 MG/1
TABLET, FILM COATED ORAL
Qty: 180 TABLET | Refills: 1 | Status: SHIPPED | OUTPATIENT
Start: 2024-09-04

## 2024-09-18 ENCOUNTER — OFFICE VISIT (OUTPATIENT)
Dept: INTERNAL MEDICINE | Facility: CLINIC | Age: 69
End: 2024-09-18
Payer: COMMERCIAL

## 2024-09-18 VITALS
WEIGHT: 245.2 LBS | HEIGHT: 73 IN | SYSTOLIC BLOOD PRESSURE: 125 MMHG | OXYGEN SATURATION: 97 % | BODY MASS INDEX: 32.5 KG/M2 | DIASTOLIC BLOOD PRESSURE: 85 MMHG | HEART RATE: 67 BPM

## 2024-09-18 DIAGNOSIS — K21.9 GASTROESOPHAGEAL REFLUX DISEASE, UNSPECIFIED WHETHER ESOPHAGITIS PRESENT: Chronic | ICD-10-CM

## 2024-09-18 DIAGNOSIS — Z76.89 ENCOUNTER TO ESTABLISH CARE WITH NEW DOCTOR: Primary | ICD-10-CM

## 2024-09-18 DIAGNOSIS — N18.2 STAGE 2 CHRONIC KIDNEY DISEASE: Chronic | ICD-10-CM

## 2024-09-18 DIAGNOSIS — E78.2 MIXED HYPERLIPIDEMIA: Chronic | ICD-10-CM

## 2024-09-18 PROCEDURE — 99214 OFFICE O/P EST MOD 30 MIN: CPT | Performed by: STUDENT IN AN ORGANIZED HEALTH CARE EDUCATION/TRAINING PROGRAM

## 2024-09-18 RX ORDER — LANOLIN ALCOHOL/MO/W.PET/CERES
3 CREAM (GRAM) TOPICAL DAILY
COMMUNITY

## 2024-09-18 RX ORDER — DIPHENHYDRAMINE HCL 25 MG
25 CAPSULE ORAL
COMMUNITY

## 2024-11-01 DIAGNOSIS — K21.9 GASTROESOPHAGEAL REFLUX DISEASE: ICD-10-CM

## 2024-11-01 DIAGNOSIS — K21.9 GASTROESOPHAGEAL REFLUX DISEASE, UNSPECIFIED WHETHER ESOPHAGITIS PRESENT: ICD-10-CM

## 2024-12-03 DIAGNOSIS — E78.2 MIXED HYPERLIPIDEMIA: ICD-10-CM

## 2024-12-03 DIAGNOSIS — K21.9 GASTROESOPHAGEAL REFLUX DISEASE, UNSPECIFIED WHETHER ESOPHAGITIS PRESENT: ICD-10-CM

## 2024-12-03 DIAGNOSIS — K21.9 GASTROESOPHAGEAL REFLUX DISEASE: ICD-10-CM

## 2024-12-03 RX ORDER — ATORVASTATIN CALCIUM 20 MG/1
20 TABLET, FILM COATED ORAL EVERY EVENING
Qty: 90 TABLET | Refills: 0 | Status: SHIPPED | OUTPATIENT
Start: 2024-12-03

## 2024-12-10 ENCOUNTER — OFFICE VISIT (OUTPATIENT)
Age: 69
End: 2024-12-10
Payer: COMMERCIAL

## 2024-12-10 VITALS
BODY MASS INDEX: 31.81 KG/M2 | HEART RATE: 65 BPM | WEIGHT: 240 LBS | SYSTOLIC BLOOD PRESSURE: 112 MMHG | DIASTOLIC BLOOD PRESSURE: 74 MMHG | HEIGHT: 73 IN

## 2024-12-10 DIAGNOSIS — I48.0 PAROXYSMAL ATRIAL FIBRILLATION: Primary | ICD-10-CM

## 2024-12-10 PROCEDURE — 99213 OFFICE O/P EST LOW 20 MIN: CPT | Performed by: INTERNAL MEDICINE

## 2024-12-10 RX ORDER — METOPROLOL SUCCINATE 25 MG/1
25 TABLET, EXTENDED RELEASE ORAL DAILY
COMMUNITY
Start: 2024-11-01

## 2024-12-10 NOTE — PROGRESS NOTES
Subjective:     Encounter Date: 12/10/24        Patient ID: Darek Oakley is a 69 y.o. male.    Chief Complaint: New onset atrial fibrillation  HPI:   This is a 69-year-old man who was diagnosed with AF in February 2022.   He is an avid runner and was having debilitating tachycardia.  This improved substantially with the addition of Toprol.  We increased Toprol to 25 mg twice daily due to weekly A. fib episodes.  However, this eventually lost efficacy.    He was seen by EP at [facility] at his request.  They started flecainide which was initially helpful however again this became less efficacious.  Ultimately he underwent PVI ablation with Dr. Alarcon.  He is very happy with results.  No known recurrence of A-fib.  She has stopped his Eliquis and flecainide.  He is on low-dose metoprolol and tolerating this well.  He is back to jogging without any difficulty.    His wife Karlene is my patient recently had bypass surgery.    The following portions of the patient's history were reviewed and updated as appropriate: allergies, current medications, past family history, past medical history, past social history, past surgical history and problem list.     REVIEW OF SYSTEMS:   All systems reviewed.  Pertinent positives identified in HPI.  All other systems are negative.    Past Medical History:   Diagnosis Date    Abnormal ECG March 2021    Atrial Fibrillation    Allergic     Benign prostatic hyperplasia 2020    Erectile dysfunction     GERD (gastroesophageal reflux disease)     History of colon polyps     History of COVID-19     2023    Hyperlipidemia     Stage 3a chronic kidney disease 04/13/2021       Family History   Problem Relation Age of Onset    Hyperlipidemia Mother     Cancer Mother         Breast cancer, lung cancer    Hyperlipidemia Father     Hyperlipidemia Sister     Arrhythmia Sister     Hyperlipidemia Sister     Hyperlipidemia Brother     Malig Hyperthermia Neg Hx        Social History     Socioeconomic  History    Marital status:    Tobacco Use    Smoking status: Former     Current packs/day: 0.00     Average packs/day: 2.0 packs/day for 11.0 years (22.0 ttl pk-yrs)     Types: Cigarettes     Start date: 1973     Quit date: 1984     Years since quittin.9    Smokeless tobacco: Never    Tobacco comments:     Patient quit smoking in the 1980s he says.   Vaping Use    Vaping status: Never Used   Substance and Sexual Activity    Alcohol use: No    Drug use: No    Sexual activity: Yes     Partners: Female     Birth control/protection: Surgical, Post-menopausal, None, Vasectomy     Comment: vasectomy       Allergies   Allergen Reactions    No Known Drug Allergy        Past Surgical History:   Procedure Laterality Date    CARDIAC ABLATION Left 2024    COLONOSCOPY  ?     WNL    COLONOSCOPY N/A 2019    Procedure: COLONOSCOPY into cecum with cold snare polypectomies;  Surgeon: Stuart Scott MD;  Location: [facility] ENDOSCOPY;  Service: Gastroenterology    COLONOSCOPY W/ POLYPECTOMY N/A 2024    Procedure: COLONOSCOPY INTO CECUM WITH COLD SNARE & BIOPSY POLYPECTOMIES;  Surgeon: Martín Beckham MD;  [facility];  Service: Gastroenterology;  Laterality: N/A;  PRE: PERSONAL H/O COLON POLYPS /  POST: POLYPS, DIVERTICULOSIS    HERNIA REPAIR      TONSILLECTOMY         Procedures     Objective:       PHYSICAL EXAM:  GEN: VSS, no distress,   Eyes: normal sclera, normal lids and lashes  HENT: moist mucus membranes,   Respiratory: CTAB, no rales or wheezes  CV: RRR, no murmurs, , +2 DP and 2+ carotid pulses b/l  GI: NABS, soft,  Nontender, nondistended  MSK: no edema, no scoliosis or kyphosis  Skin: no rash, warm, dry  Heme/Lymph: no bruising or bleeding  Psych: organized thought, normal behavior and affect  Neuro: Cranial nerves grossly intact, Alert and Oriented x 3.         Assessment:          Diagnosis Plan   1. Paroxysmal atrial fibrillation             Plan:       1.   Paroxysmal atrial fibrillation: Status post PVI ablation at [facility] with Dr. Alarcon. Now off AC and flecanide, tolerating low dose BB.  I have given him instructions to restart Eliquis if he has recurrence of A-fib and call me.  2. Dilated LV, LVH: likely related to athelic heart. No signs of CHF.     Dr. Wallace, thank you very much for referring this kind patient to me. Please call me with any questions or concerns. I will see the patient again in the office in 12 months.         Enedina Santa MD  12/10/24  [facility]    Outpatient Encounter Medications as of 12/10/2024   Medication Sig Dispense Refill    atorvastatin (LIPITOR) 20 MG tablet Take 1 tablet by mouth Every Evening. 90 tablet 0    diphenhydrAMINE (BENADRYL) 25 mg capsule Take 1 capsule by mouth As Needed.      melatonin 3 MG tablet Take 1 tablet by mouth Daily.      metoprolol succinate XL (TOPROL-XL) 25 MG 24 hr tablet Take 1 tablet by mouth Daily.      omeprazole (priLOSEC) 20 MG capsule TAKE 1 CAPSULE BY MOUTH DAILY 90 capsule 0    sildenafil (VIAGRA) 50 MG tablet Take 1 tablet by mouth As Needed for Erectile Dysfunction. 4 tablet 3    [DISCONTINUED] metoprolol succinate XL (TOPROL-XL) 50 MG 24 hr tablet Take 0.5 tablets by mouth 2 (Two) Times a Day. (Patient taking differently: Take 0.5 tablets by mouth Daily.) 90 tablet 0    Eliquis 5 MG tablet tablet TAKE 1 TABLET BY MOUTH TWICE DAILY 180 tablet 1    influenza vac split high-dose (Fluzone High-Dose) 0.5 ML suspension prefilled syringe injection Inject 0.5 mL into the appropriate muscle as directed by prescriber. 0.5 mL 0     No facility-administered encounter medications on file as of 12/10/2024.

## 2025-03-12 DIAGNOSIS — E78.2 MIXED HYPERLIPIDEMIA: ICD-10-CM

## 2025-03-12 RX ORDER — ATORVASTATIN CALCIUM 20 MG/1
20 TABLET, FILM COATED ORAL EVERY EVENING
Qty: 90 TABLET | Refills: 0 | Status: SHIPPED | OUTPATIENT
Start: 2025-03-12

## 2025-04-02 ENCOUNTER — APPOINTMENT (OUTPATIENT)
Dept: CT IMAGING | Facility: HOSPITAL | Age: 70
End: 2025-04-02
Payer: COMMERCIAL

## 2025-04-02 ENCOUNTER — OFFICE VISIT (OUTPATIENT)
Dept: INTERNAL MEDICINE | Facility: CLINIC | Age: 70
End: 2025-04-02
Payer: COMMERCIAL

## 2025-04-02 ENCOUNTER — HOSPITAL ENCOUNTER (EMERGENCY)
Facility: HOSPITAL | Age: 70
Discharge: HOME | End: 2025-04-02
Attending: EMERGENCY MEDICINE
Payer: COMMERCIAL

## 2025-04-02 VITALS
RESPIRATION RATE: 16 BRPM | WEIGHT: 225 LBS | HEART RATE: 50 BPM | SYSTOLIC BLOOD PRESSURE: 122 MMHG | TEMPERATURE: 98 F | HEIGHT: 73 IN | DIASTOLIC BLOOD PRESSURE: 72 MMHG | BODY MASS INDEX: 29.82 KG/M2 | OXYGEN SATURATION: 96 %

## 2025-04-02 VITALS
HEIGHT: 73 IN | HEART RATE: 53 BPM | SYSTOLIC BLOOD PRESSURE: 138 MMHG | RESPIRATION RATE: 18 BRPM | BODY MASS INDEX: 29.82 KG/M2 | OXYGEN SATURATION: 98 % | DIASTOLIC BLOOD PRESSURE: 76 MMHG | WEIGHT: 225 LBS

## 2025-04-02 DIAGNOSIS — E78.2 MIXED HYPERLIPIDEMIA: ICD-10-CM

## 2025-04-02 DIAGNOSIS — R51.9 OCCIPITAL HEADACHE: Primary | ICD-10-CM

## 2025-04-02 DIAGNOSIS — R51.9 NONINTRACTABLE HEADACHE, UNSPECIFIED CHRONICITY PATTERN, UNSPECIFIED HEADACHE TYPE: Primary | ICD-10-CM

## 2025-04-02 DIAGNOSIS — G31.9 DIFFUSE CEREBRAL ATROPHY: ICD-10-CM

## 2025-04-02 DIAGNOSIS — N18.2 STAGE 2 CHRONIC KIDNEY DISEASE: ICD-10-CM

## 2025-04-02 PROBLEM — Z12.11 ENCOUNTER FOR SCREENING FOR MALIGNANT NEOPLASM OF COLON: Status: RESOLVED | Noted: 2019-05-10 | Resolved: 2025-04-02

## 2025-04-02 LAB
ALBUMIN SERPL-MCNC: 4.3 G/DL (ref 3.5–5.2)
ALBUMIN/GLOB SERPL: 1.7 G/DL
ALP SERPL-CCNC: 111 U/L (ref 39–117)
ALT SERPL-CCNC: 32 U/L (ref 1–41)
AST SERPL-CCNC: 38 U/L (ref 1–40)
BASOPHILS # BLD AUTO: 0.01 10*3/MM3 (ref 0–0.2)
BASOPHILS NFR BLD AUTO: 0.2 % (ref 0–1.5)
BILIRUB SERPL-MCNC: 0.4 MG/DL (ref 0–1.2)
BUN SERPL-MCNC: 12 MG/DL (ref 8–23)
BUN/CREAT SERPL: 10.3 (ref 7–25)
CALCIUM SERPL-MCNC: 9.5 MG/DL (ref 8.6–10.5)
CHLORIDE SERPL-SCNC: 103 MMOL/L (ref 98–107)
CHOLEST SERPL-MCNC: 195 MG/DL (ref 0–200)
CO2 SERPL-SCNC: 24.5 MMOL/L (ref 22–29)
CREAT SERPL-MCNC: 1.16 MG/DL (ref 0.76–1.27)
EGFRCR SERPLBLD CKD-EPI 2021: 68.6 ML/MIN/1.73
EOSINOPHIL # BLD AUTO: 0 10*3/MM3 (ref 0–0.4)
EOSINOPHIL NFR BLD AUTO: 0 % (ref 0.3–6.2)
ERYTHROCYTE [DISTWIDTH] IN BLOOD BY AUTOMATED COUNT: 13.1 % (ref 12.3–15.4)
GLOBULIN SER CALC-MCNC: 2.5 GM/DL
GLUCOSE SERPL-MCNC: 107 MG/DL (ref 65–99)
HCT VFR BLD AUTO: 41.8 % (ref 37.5–51)
HDLC SERPL-MCNC: 50 MG/DL (ref 40–60)
HGB BLD-MCNC: 13.7 G/DL (ref 13–17.7)
IMM GRANULOCYTES # BLD AUTO: 0.01 10*3/MM3 (ref 0–0.05)
IMM GRANULOCYTES NFR BLD AUTO: 0.2 % (ref 0–0.5)
LDLC SERPL CALC-MCNC: 130 MG/DL (ref 0–100)
LYMPHOCYTES # BLD AUTO: 0.54 10*3/MM3 (ref 0.7–3.1)
LYMPHOCYTES NFR BLD AUTO: 12.6 % (ref 19.6–45.3)
MCH RBC QN AUTO: 29.5 PG (ref 26.6–33)
MCHC RBC AUTO-ENTMCNC: 32.8 G/DL (ref 31.5–35.7)
MCV RBC AUTO: 89.9 FL (ref 79–97)
MONOCYTES # BLD AUTO: 0.39 10*3/MM3 (ref 0.1–0.9)
MONOCYTES NFR BLD AUTO: 9.1 % (ref 5–12)
NEUTROPHILS # BLD AUTO: 3.33 10*3/MM3 (ref 1.7–7)
NEUTROPHILS NFR BLD AUTO: 77.9 % (ref 42.7–76)
NRBC BLD AUTO-RTO: 0 /100 WBC (ref 0–0.2)
PLATELET # BLD AUTO: 259 10*3/MM3 (ref 140–450)
POTASSIUM SERPL-SCNC: 4.7 MMOL/L (ref 3.5–5.2)
PROT SERPL-MCNC: 6.8 G/DL (ref 6–8.5)
RBC # BLD AUTO: 4.65 10*6/MM3 (ref 4.14–5.8)
SODIUM SERPL-SCNC: 137 MMOL/L (ref 136–145)
TRIGL SERPL-MCNC: 85 MG/DL (ref 0–150)
VLDLC SERPL CALC-MCNC: 15 MG/DL (ref 5–40)
WBC # BLD AUTO: 4.28 10*3/MM3 (ref 3.4–10.8)

## 2025-04-02 PROCEDURE — 25010000002 MAGNESIUM SULFATE IN D5W 1G/100ML (PREMIX) 1-5 GM/100ML-% SOLUTION: Performed by: EMERGENCY MEDICINE

## 2025-04-02 PROCEDURE — 99284 EMERGENCY DEPT VISIT MOD MDM: CPT

## 2025-04-02 PROCEDURE — 25810000003 SODIUM CHLORIDE 0.9 % SOLUTION: Performed by: EMERGENCY MEDICINE

## 2025-04-02 PROCEDURE — 25010000002 DEXAMETHASONE SODIUM PHOSPHATE 20 MG/5ML SOLUTION: Performed by: EMERGENCY MEDICINE

## 2025-04-02 PROCEDURE — 70450 CT HEAD/BRAIN W/O DYE: CPT

## 2025-04-02 PROCEDURE — 96365 THER/PROPH/DIAG IV INF INIT: CPT

## 2025-04-02 PROCEDURE — 96375 TX/PRO/DX INJ NEW DRUG ADDON: CPT

## 2025-04-02 RX ORDER — MAGNESIUM SULFATE 1 G/100ML
1 INJECTION INTRAVENOUS ONCE
Status: COMPLETED | OUTPATIENT
Start: 2025-04-02 | End: 2025-04-02

## 2025-04-02 RX ORDER — DEXAMETHASONE SODIUM PHOSPHATE 4 MG/ML
4 INJECTION, SOLUTION INTRA-ARTICULAR; INTRALESIONAL; INTRAMUSCULAR; INTRAVENOUS; SOFT TISSUE ONCE
Status: COMPLETED | OUTPATIENT
Start: 2025-04-02 | End: 2025-04-02

## 2025-04-02 RX ORDER — BUTALBITAL, ACETAMINOPHEN AND CAFFEINE 50; 325; 40 MG/1; MG/1; MG/1
2 TABLET ORAL ONCE
Status: COMPLETED | OUTPATIENT
Start: 2025-04-02 | End: 2025-04-02

## 2025-04-02 RX ORDER — METHYLPREDNISOLONE 4 MG/1
TABLET ORAL
Qty: 1 EACH | Refills: 0 | Status: SHIPPED | OUTPATIENT
Start: 2025-04-02

## 2025-04-02 RX ADMIN — DEXAMETHASONE SODIUM PHOSPHATE 4 MG: 4 INJECTION, SOLUTION INTRAMUSCULAR; INTRAVENOUS at 04:31

## 2025-04-02 RX ADMIN — SODIUM CHLORIDE 1000 ML: 9 INJECTION, SOLUTION INTRAVENOUS at 04:40

## 2025-04-02 RX ADMIN — MAGNESIUM SULFATE IN DEXTROSE 1 G: 10 INJECTION, SOLUTION INTRAVENOUS at 04:33

## 2025-04-02 RX ADMIN — BUTALBITAL, ACETAMINOPHEN, AND CAFFEINE 2 TABLET: 50; 325; 40 TABLET ORAL at 04:28

## 2025-04-02 NOTE — ED PROVIDER NOTES
EMERGENCY DEPARTMENT ENCOUNTER  Room Number:  12/12  PCP: Margot Mendez MD  Independent Historians: Patient  Date of encounter:  4/2/2025  Patient Care Team:  Margot Mendez MD as PCP - General (Internal Medicine)     HPI:  Chief Complaint: had concerns including Headache (Pain to back of head and two swollen areas for past 4 days, pain radiates to front of head and down neck).     A complete HPI/ROS/PMH/PSH/SH/FH are unobtainable due to: None    Chronic or social conditions impacting patient care (Social Determinants of Health): None    Context: Darek Oakley is a 69 y.o. male with a medical history of GERD, hyperlipidemia, CKD, atrial fibrillation status post ablation who presents to the ED c/o acute headache.  Patient presents for diffuse headache.  Headache begins in the back of his head and radiates forward.  It is bilateral but more significant on the right.  Has had associated photophobia and phonophobia.  No nausea or vomiting.  No fevers or chills.  Patient does not have headaches at this typically.  He took Tylenol earlier without symptoms.  Previously took Eliquis but is no longer on that medication after ablation in January of this year.    PAST MEDICAL HISTORY  Active Ambulatory Problems     Diagnosis Date Noted    ED (erectile dysfunction) of organic origin 04/10/2016    Gastroesophageal reflux disease 04/10/2016    HLD (hyperlipidemia) 04/10/2016    LBP (low back pain) 04/10/2016    Benign prostatic hyperplasia with nocturia 04/10/2016    Allergic rhinitis, seasonal 04/10/2016    Pulmonary nodules 04/12/2016    Encounter for screening for malignant neoplasm of colon 05/10/2019    Stage 2 chronic kidney disease 04/13/2021    Paroxysmal atrial fibrillation 07/29/2022    Adenomatous polyp of colon 08/10/2023    Personal history of colonic polyps 02/12/2024     Resolved Ambulatory Problems     Diagnosis Date Noted    Lung mass 04/10/2016     Past Medical History:   Diagnosis Date    Abnormal ECG  2021    Allergic     Benign prostatic hyperplasia     Erectile dysfunction     GERD (gastroesophageal reflux disease)     History of colon polyps     History of COVID-19     Hyperlipidemia     Stage 3a chronic kidney disease 2021       PAST SURGICAL HISTORY  Past Surgical History:   Procedure Laterality Date    CARDIAC ABLATION Left 2024    COLONOSCOPY  ?     WNL    COLONOSCOPY N/A 2019    Procedure: COLONOSCOPY into cecum with cold snare polypectomies;  Surgeon: Stuart Scott MD;  Location:  [facility];  Service: Gastroenterology    COLONOSCOPY W/ POLYPECTOMY N/A 2024    Procedure: COLONOSCOPY INTO CECUM WITH COLD SNARE & BIOPSY POLYPECTOMIES;  Surgeon: Martín Beckham MD;  Location:  [facility];  Service: Gastroenterology;  Laterality: N/A;  PRE: PERSONAL H/O COLON POLYPS /  POST: POLYPS, DIVERTICULOSIS    HERNIA REPAIR      TONSILLECTOMY         FAMILY HISTORY  Family History   Problem Relation Age of Onset    Hyperlipidemia Mother     Cancer Mother         Breast cancer, lung cancer    Hyperlipidemia Father     Hyperlipidemia Sister     Arrhythmia Sister     Hyperlipidemia Sister     Hyperlipidemia Brother     Malig Hyperthermia Neg Hx        SOCIAL HISTORY  Social History     Socioeconomic History    Marital status:    Tobacco Use    Smoking status: Former     Current packs/day: 0.00     Average packs/day: 2.0 packs/day for 11.0 years (22.0 ttl pk-yrs)     Types: Cigarettes     Start date: 1973     Quit date: 1984     Years since quittin.2    Smokeless tobacco: Never    Tobacco comments:     Patient quit smoking in the 1980s he says.   Vaping Use    Vaping status: Never Used   Substance and Sexual Activity    Alcohol use: No    Drug use: No    Sexual activity: Yes     Partners: Female     Birth control/protection: Surgical, Post-menopausal, None, Vasectomy     Comment: vasectomy       ALLERGIES  No known drug allergy    REVIEW OF  SYSTEMS  Review of Systems  Included in HPI  All systems reviewed and negative except for those discussed in HPI.    PHYSICAL EXAM    I have reviewed the triage vital signs and nursing notes.    ED Triage Vitals   Temp Heart Rate Resp BP SpO2   04/02/25 0412 04/02/25 0410 04/02/25 0410 04/02/25 0412 04/02/25 0410   98 °F (36.7 °C) 61 16 131/85 96 %      Temp src Heart Rate Source Patient Position BP Location FiO2 (%)   04/02/25 0412 -- -- -- --   Oral           Physical Exam  Vitals and nursing note reviewed.   Constitutional:       General: He is not in acute distress.     Appearance: Normal appearance. He is not ill-appearing, toxic-appearing or diaphoretic.   HENT:      Head: Normocephalic and atraumatic.      Nose: Nose normal.      Mouth/Throat:      Mouth: Mucous membranes are moist.   Eyes:      Extraocular Movements: Extraocular movements intact.      Conjunctiva/sclera: Conjunctivae normal.      Pupils: Pupils are equal, round, and reactive to light.   Neck:      Comments: No nuchal rigidity or meningismus  Cardiovascular:      Rate and Rhythm: Normal rate and regular rhythm.      Pulses: Normal pulses.      Heart sounds: Normal heart sounds. No murmur heard.     No friction rub. No gallop.   Pulmonary:      Effort: Pulmonary effort is normal. No respiratory distress.      Breath sounds: Normal breath sounds. No stridor. No wheezing, rhonchi or rales.   Abdominal:      General: Abdomen is flat. There is no distension.      Palpations: Abdomen is soft.      Tenderness: There is no abdominal tenderness. There is no guarding or rebound.   Musculoskeletal:      Cervical back: Normal range of motion and neck supple. No rigidity or tenderness.   Skin:     General: Skin is warm and dry.      Capillary Refill: Capillary refill takes less than 2 seconds.   Neurological:      General: No focal deficit present.      Mental Status: He is alert and oriented to person, place, and time. Mental status is at baseline.       Comments: EOMI, no facial droop, normal sensation to both sides of face, no dysarthria, no aphasia, 5/5 strength in upper extremities, normal sensation bilateral upper extremities, 5/5 strength in bilateral lower extremities, normal sensation bilateral lower extremities, normal coordination   Psychiatric:         Mood and Affect: Mood normal.         Behavior: Behavior normal.         Thought Content: Thought content normal.         Judgment: Judgment normal.         LAB RESULTS  No results found for this or any previous visit (from the past 24 hours).    RADIOLOGY  CT Head Without Contrast  Result Date: 4/2/2025  CT OF THE HEAD WITHOUT CONTRAST  HISTORY: Headache  COMPARISON: None available.  TECHNIQUE: Axial CT imaging was obtained through the brain. No IV contrast was administered.  FINDINGS: No acute intracranial hemorrhage is seen. There is diffuse atrophy. There is periventricular and deep white matter microangiopathic change. There is no midline shift or mass effect. Mucosal thickening is noted within the ethmoid sinuses. Right mastoid air cells appear relatively underpneumatized.      No acute intracranial findings.  Radiation dose reduction techniques were utilized, including automated exposure control and exposure modulation based on body size.   This report was finalized on 4/2/2025 5:10 AM by Dr. Lulu Alexander M.D on Workstation: [id number]        MEDICATIONS GIVEN IN ER  Medications   sodium chloride 0.9 % bolus 1,000 mL ( Intravenous Currently Infusing 4/2/25 9604)   butalbital-acetaminophen-caffeine (FIORICET, ESGIC) -40 MG per tablet 2 tablet (2 tablets Oral Given 4/2/25 6918)   dexAMETHasone sodium phosphate injection 4 mg (4 mg Intravenous Given 4/2/25 7367)   magnesium sulfate in D5W 1g/100mL (PREMIX) (0 g Intravenous Stopped 4/2/25 8730)       ORDERS PLACED DURING THIS VISIT:  Orders Placed This Encounter   Procedures    CT Head Without Contrast       OUTPATIENT MEDICATION  MANAGEMENT:  No current Epic-ordered facility-administered medications on file.     Current Outpatient Medications Ordered in Epic   Medication Sig Dispense Refill    atorvastatin (LIPITOR) 20 MG tablet TAKE 1 TABLET BY MOUTH EVERY EVENING 90 tablet 0    diphenhydrAMINE (BENADRYL) 25 mg capsule Take 1 capsule by mouth As Needed.      Eliquis 5 MG tablet tablet TAKE 1 TABLET BY MOUTH TWICE DAILY 180 tablet 1    influenza vac split high-dose (Fluzone High-Dose) 0.5 ML suspension prefilled syringe injection Inject 0.5 mL into the appropriate muscle as directed by prescriber. 0.5 mL 0    melatonin 3 MG tablet Take 1 tablet by mouth Daily.      metoprolol succinate XL (TOPROL-XL) 25 MG 24 hr tablet Take 1 tablet by mouth Daily.      omeprazole (priLOSEC) 20 MG capsule TAKE 1 CAPSULE BY MOUTH DAILY 90 capsule 0    sildenafil (VIAGRA) 50 MG tablet Take 1 tablet by mouth As Needed for Erectile Dysfunction. 4 tablet 3       PROCEDURES  Procedures    PROGRESS, DATA ANALYSIS, CONSULTS, AND MEDICAL DECISION MAKING  All labs have been independently interpreted by me.  All radiology studies have been reviewed by me. All EKG's have been independently viewed and interpreted by me.  Discussion below represents my analysis of pertinent findings related to patient's condition, differential diagnosis, treatment plan and final disposition.    Differential diagnosis includes but is not limited to tension headache, intracranial hemorrhage, brain mass.    Clinical Scores:                   ED Course as of 04/02/25 0558   Wed Apr 02, 2025   0457 CT Head Without Contrast  My independent interpretation of the imaging study is no ICH [AB]   0556 Patient rechecked.  Patient has had significant improvement in his headache after receiving Fioricet, magnesium and fluids.  Informed of abnormal head CT.  Spoke with patient's wife at bedside expressing concerns over the patient's headache.  Explained that patient has a benign exam, symptoms most  consistent with a tension headache and normal imaging.  Patient also had symptomatic improvement after medications.  No current evidence of other underlying pathology.  Patient appropriate for outpatient management and follow-up with PCP.  Encouraged to use over-the-counter medications for return of symptoms.  [AB]      ED Course User Index  [AB] Carloz Yi MD             AS OF 05:58 EDT VITALS:    BP - 122/72  HR - 51  TEMP - 98 °F (36.7 °C) (Oral)  O2 SATS - 96%    COMPLEXITY OF CARE  Admission was considered but after careful review of the patient's presentation, physical examination, diagnostic results, and response to treatment the patient may be safely discharged with outpatient follow-up.      DIAGNOSIS  Final diagnoses:   Nonintractable headache, unspecified chronicity pattern, unspecified headache type         DISPOSITION  ED Disposition       ED Disposition   Discharge    Condition   Stable    Comment   --                Please note that portions of this document were completed with a voice recognition program.    Note Disclaimer: At [facility], we believe that sharing information builds trust and better relationships. You are receiving this note because you recently visited [facility]. It is possible you will see health information before a provider has talked with you about it. This kind of information can be easy to misunderstand. To help you fully understand what it means for your health, we urge you to discuss this note with your provider.         Carloz Yi MD  04/02/25 0565

## 2025-04-02 NOTE — PROGRESS NOTES
"Chief Complaint  Hospital follow up, right sided headache, nodules on back of head    Subjective        Darek Oakley presents to clinic today for hospital follow up and new headache.  History of Present Illness  He experienced a sudden onset of jaw pain upon awakening on Saturday, which he initially attributed to his known history of TMJ. The pain subsequently radiated to the top of his head. He describes the pain as shooting, affecting various areas including his right eye, lower jaw, and sinuses. The pain is less intense on the left side but is associated with a bump on the right side of the back of his head. He sought emergency care, where a CT scan was performed, and he was diagnosed with headache. He was treated with Fioricet and a steroid, which significantly reduced his pain.     He reports no current pain but notes that it can be triggered by palpation of the small masses at the back of his head. He reports no dental issues or difficulty chewing. He also reports no visual changes, slurred speech, nausea, or vomiting. The pain is intermittent and disrupts his sleep. He was not prescribed any medication during his hospital visit. He has a history of atrial fibrillation and avoids caffeine (i.e. Fioricet).     Objective   Vital Signs:  /76 (BP Location: Left arm, Patient Position: Sitting, Cuff Size: Adult)   Pulse 53   Resp 18   Ht 185.4 cm (73\")   Wt 102 kg (225 lb)   SpO2 98%   BMI 29.69 kg/m²   Estimated body mass index is 29.69 kg/m² as calculated from the following:    Height as of this encounter: 185.4 cm (73\").    Weight as of this encounter: 102 kg (225 lb).    Physical Exam  Constitutional:       General: He is not in acute distress.     Appearance: Normal appearance.   HENT:      Head:      Comments: There are a few small, subdural nodules over the occipital region that are tender to palpation. They are not erythematous. No leakage noted.   Pulmonary:      Effort: Pulmonary effort is " normal. No respiratory distress.   Musculoskeletal:         General: No swelling or deformity. Normal range of motion.   Skin:     General: Skin is warm and dry.   Neurological:      General: No focal deficit present.      Mental Status: He is oriented to person, place, and time. Mental status is at baseline.      Motor: No weakness.      Gait: Gait normal.   Psychiatric:         Mood and Affect: Mood normal.         Behavior: Behavior normal.     Result Review :               Current Outpatient Medications:     atorvastatin (LIPITOR) 20 MG tablet, TAKE 1 TABLET BY MOUTH EVERY EVENING, Disp: 90 tablet, Rfl: 0    diphenhydrAMINE (BENADRYL) 25 mg capsule, Take 1 capsule by mouth As Needed., Disp: , Rfl:     influenza vac split high-dose (Fluzone High-Dose) 0.5 ML suspension prefilled syringe injection, Inject 0.5 mL into the appropriate muscle as directed by prescriber., Disp: 0.5 mL, Rfl: 0    melatonin 3 MG tablet, Take 1 tablet by mouth Daily., Disp: , Rfl:     metoprolol succinate XL (TOPROL-XL) 25 MG 24 hr tablet, Take 1 tablet by mouth Daily., Disp: , Rfl:     omeprazole (priLOSEC) 20 MG capsule, TAKE 1 CAPSULE BY MOUTH DAILY, Disp: 90 capsule, Rfl: 0    sildenafil (VIAGRA) 50 MG tablet, Take 1 tablet by mouth As Needed for Erectile Dysfunction., Disp: 4 tablet, Rfl: 3    Eliquis 5 MG tablet tablet, TAKE 1 TABLET BY MOUTH TWICE DAILY (Patient not taking: Reported on 4/2/2025), Disp: 180 tablet, Rfl: 1    methylPREDNISolone (MEDROL) 4 MG dose pack, Take as directed on package instructions., Disp: 1 each, Rfl: 0  No current facility-administered medications for this visit.        Assessment and Plan   Diagnoses and all orders for this visit:    1. Occipital headache (Primary)  -     methylPREDNISolone (MEDROL) 4 MG dose pack; Take as directed on package instructions.  Dispense: 1 each; Refill: 0  -     MRI Brain Without Contrast; Future    2. Stage 2 chronic kidney disease  -     Comprehensive metabolic panel  -      CBC w AUTO Differential    3. Mixed hyperlipidemia  -     Lipid panel    4. Diffuse cerebral atrophy  -     MRI Brain Without Contrast; Future       Small, tender nodules on back of head are likely subdural cysts versus swollen lymph nodes. Regardless, will give Medrol dose pack for pain. MRI to follow up atrophy noted on CT Head from ED visit today. If headaches continue or there are any abnormalities on MRI, can consider neurology referral at that time. Differential at this time includes migraine versus cluster headaches versus trigeminal neuralgia.         Follow Up   Return for regularly scheduled appointment with me.    Patient was given instructions and counseling regarding his condition or for health maintenance advice. Please see specific information pulled into the AVS if appropriate.     Margot Mendez MD    Patient or patient representative verbalized consent for the use of Ambient Listening during the visit with  Margot Mendez MD for chart documentation. 4/2/2025  16:10 EDT

## 2025-04-02 NOTE — PATIENT INSTRUCTIONS
I would recommend over the counter acetaminophen 1000mg every 8 hours as needed as needed for head pain.

## 2025-04-04 ENCOUNTER — OFFICE VISIT (OUTPATIENT)
Dept: INTERNAL MEDICINE | Facility: CLINIC | Age: 70
End: 2025-04-04
Payer: COMMERCIAL

## 2025-04-04 VITALS
HEART RATE: 42 BPM | DIASTOLIC BLOOD PRESSURE: 70 MMHG | BODY MASS INDEX: 31.14 KG/M2 | OXYGEN SATURATION: 98 % | RESPIRATION RATE: 20 BRPM | WEIGHT: 235 LBS | SYSTOLIC BLOOD PRESSURE: 132 MMHG | HEIGHT: 73 IN

## 2025-04-04 DIAGNOSIS — E78.00 PURE HYPERCHOLESTEROLEMIA: Chronic | ICD-10-CM

## 2025-04-04 DIAGNOSIS — S00.06XA INSECT BITE OF SCALP, INITIAL ENCOUNTER: Primary | ICD-10-CM

## 2025-04-04 DIAGNOSIS — R21 RASH: ICD-10-CM
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
## 2025-04-04 NOTE — ASSESSMENT & PLAN NOTE
LDL above goal  Patient declined adding Zetia or increasing atorvastatin   Recommend that patient continue life style modifications with balanced diet and regular exercise

## 2025-04-04 NOTE — PROGRESS NOTES
"Chief Complaint  Insect Bite and Neck Pain    Subjective        Darek Oakley and wife presents to go over nodules on back of head that we discussed at last visit on 4/2/25. Wife states that she was looking at the nodules again and saw some areas that were concerning for bug or tick bites. Patient denies seeing any insects in the bed or around that scalp, however they state that [place] and have many insects around. He reports that the pain in the area has improved with starting the Medrol pack prescribed at last visit. No drainage reported from the nodules.     Also, went over recent lab results with patient today. Let him know that his kidney function, liver function, electrolytes, and blood counts all appear normal. Let him know that LDL was above goal and advised either increasing atorvastatin or adding Zetia at this time. Patient stated that he and Dr. Wallace decreased atorvastatin for concern for musculoskeletal side effects. He was not interested in adding Zetia, opting for lifestyle modifications at this point.  History of Present Illness      Objective   Vital Signs:  /70 (BP Location: Left arm, Patient Position: Sitting, Cuff Size: Adult)   Pulse (!) 42   Resp 20   Ht 185.4 cm (73\")   Wt 107 kg (235 lb)   SpO2 98%   BMI 31.00 kg/m²   Estimated body mass index is 31 kg/m² as calculated from the following:    Height as of this encounter: 185.4 cm (73\").    Weight as of this encounter: 107 kg (235 lb).    Physical Exam  Constitutional:       General: He is not in acute distress.     Appearance: Normal appearance.   Pulmonary:      Effort: Pulmonary effort is normal. No respiratory distress.   Musculoskeletal:         General: No swelling or deformity. Normal range of motion.   Skin:     General: Skin is warm and dry.      Comments: Multiple small, subcutaneous nodules located over the back of the head near the base of the scalp appear similar to prior examination. There was " noted to be some central punctuation on a couple of the nodules. No drainage noted.    Neurological:      General: No focal deficit present.      Mental Status: He is oriented to person, place, and time. Mental status is at baseline.   Psychiatric:         Mood and Affect: Mood normal.         Behavior: Behavior normal.     Result Review :               Current Outpatient Medications:     atorvastatin (LIPITOR) 20 MG tablet, TAKE 1 TABLET BY MOUTH EVERY EVENING, Disp: 90 tablet, Rfl: 0    diphenhydrAMINE (BENADRYL) 25 mg capsule, Take 1 capsule by mouth As Needed., Disp: , Rfl:     influenza vac split high-dose (Fluzone High-Dose) 0.5 ML suspension prefilled syringe injection, Inject 0.5 mL into the appropriate muscle as directed by prescriber., Disp: 0.5 mL, Rfl: 0    melatonin 3 MG tablet, Take 1 tablet by mouth Daily., Disp: , Rfl:     methylPREDNISolone (MEDROL) 4 MG dose pack, Take as directed on package instructions., Disp: 1 each, Rfl: 0    metoprolol succinate XL (TOPROL-XL) 25 MG 24 hr tablet, Take 1 tablet by mouth Daily., Disp: , Rfl:     omeprazole (priLOSEC) 20 MG capsule, TAKE 1 CAPSULE BY MOUTH DAILY, Disp: 90 capsule, Rfl: 0    sildenafil (VIAGRA) 50 MG tablet, Take 1 tablet by mouth As Needed for Erectile Dysfunction., Disp: 4 tablet, Rfl: 3    amoxicillin-clavulanate (AUGMENTIN) 875-125 MG per tablet, Take 1 tablet by mouth 2 (Two) Times a Day., Disp: 10 tablet, Rfl: 0    Eliquis 5 MG tablet tablet, TAKE 1 TABLET BY MOUTH TWICE DAILY (Patient not taking: Reported on 4/4/2025), Disp: 180 tablet, Rfl: 1      Assessment and Plan   Diagnoses and all orders for this visit:    1. Insect bite of scalp, initial encounter (Primary)  -     Rickettsia Species DNA, Real-Time PCR  -     Ehrlichia Antibody Panel  -     Lyme Disease Total Antibody With Reflex to Immunoassay  -     amoxicillin-clavulanate (AUGMENTIN) 875-125 MG per tablet; Take 1 tablet by mouth 2 (Two) Times a Day.  Dispense: 10 tablet; Refill:  0    2. Rash  -     Rickettsia Species DNA, Real-Time PCR  -     Ehrlichia Antibody Panel  -     Lyme Disease Total Antibody With Reflex to Immunoassay  -     amoxicillin-clavulanate (AUGMENTIN) 875-125 MG per tablet; Take 1 tablet by mouth 2 (Two) Times a Day.  Dispense: 10 tablet; Refill: 0    3. Pure hypercholesterolemia  Assessment & Plan:  LDL above goal  Patient declined adding Zetia or increasing atorvastatin   Recommend that patient continue life style modifications with balanced diet and regular exercise         Will treat with doxycycline if tick panel comes back positive. Augmentin to cover for soft tissue infection in the meantime. Advised patient to complete steroid course as prescribed as well.         Follow Up   Return for regularly scheduled appointment with me.    Patient was given instructions and counseling regarding his condition or for health maintenance advice. Please see specific information pulled into the AVS if appropriate.     Margot Mendez MD    Patient or patient representative verbalized consent for the use of Ambient Listening during the visit with  Margot Mendez MD for chart documentation. 4/4/2025  12:14 EDT

## 2025-04-07 ENCOUNTER — TELEPHONE (OUTPATIENT)
Dept: INTERNAL MEDICINE | Facility: CLINIC | Age: 70
End: 2025-04-07

## 2025-04-07 NOTE — TELEPHONE ENCOUNTER
Caller: Darek Oakley    Relationship: Self    Best call back number: [phone]     What was the call regarding: PATIENT STATED THAT DR. STILES ORDERED AN MRI. HE SPOKE WITH SCHEDULING, AND THE SOONEST THEY COULD GET HIM IN WAS 05/04/25. HE WANTED TO CHECK WITH DR. STILES TO SEE IF THIS WAS ACCEPTABLE TO HER OR IF HE NEEDS TO BE MOVED UP TO A SOONER APPOINTMENT DATE. PLEASE ADVISE.

## 2025-04-09 LAB
A PHAGOCYTOPH IGG TITR SER IF: NEGATIVE {TITER}
A PHAGOCYTOPH IGM TITR SER IF: NEGATIVE {TITER}
B BURGDOR IGG+IGM SER QL IA: NEGATIVE
E CHAFFEENSIS IGG TITR SER IF: NEGATIVE {TITER}
E CHAFFEENSIS IGM TITR SER IF: NEGATIVE {TITER}
RESULT COMMENT:: NORMAL
RICKETTSIA RICKETTSII DNA, RT: NOT DETECTED

## 2025-05-04 ENCOUNTER — HOSPITAL ENCOUNTER (OUTPATIENT)
Dept: MRI IMAGING | Facility: HOSPITAL | Age: 70
Discharge: HOME | End: 2025-05-04
Admitting: STUDENT IN AN ORGANIZED HEALTH CARE EDUCATION/TRAINING PROGRAM
Payer: COMMERCIAL

## 2025-05-04 DIAGNOSIS — R51.9 OCCIPITAL HEADACHE: ICD-10-CM

## 2025-05-04 DIAGNOSIS — G31.9 DIFFUSE CEREBRAL ATROPHY: ICD-10-CM

## 2025-05-04 PROCEDURE — 70551 MRI BRAIN STEM W/O DYE: CPT

## 2025-05-15 DIAGNOSIS — K21.9 GASTROESOPHAGEAL REFLUX DISEASE: ICD-10-CM

## 2025-05-15 DIAGNOSIS — K21.9 GASTROESOPHAGEAL REFLUX DISEASE, UNSPECIFIED WHETHER ESOPHAGITIS PRESENT: ICD-10-CM

## 2025-05-16 RX ORDER — OMEPRAZOLE 20 MG/1
20 CAPSULE, DELAYED RELEASE ORAL DAILY
Qty: 90 CAPSULE | Refills: 0 | Status: SHIPPED | OUTPATIENT
Start: 2025-05-16

## 2025-06-01 DIAGNOSIS — K21.9 GASTROESOPHAGEAL REFLUX DISEASE: ICD-10-CM

## 2025-06-01 DIAGNOSIS — K21.9 GASTROESOPHAGEAL REFLUX DISEASE, UNSPECIFIED WHETHER ESOPHAGITIS PRESENT: ICD-10-CM

## 2025-06-02 RX ORDER — OMEPRAZOLE 20 MG/1
20 CAPSULE, DELAYED RELEASE ORAL DAILY
Qty: 90 CAPSULE | Refills: 0 | OUTPATIENT
Start: 2025-06-02

## 2025-06-02 NOTE — PROGRESS NOTES
"Chief Complaint  Annual Exam    Subjective        Darek Oakley presents to clinic today for his annual physical exam. Overall patient states that he feels well and has no acute complaints at this time. Was able to run the mini-marathon this year, and his wife did as well. He follows with cardiology for a history of atrial fibrillation status post cardiac ablation. He has been in sinus rhythm ever since and no longer takes Ac or flecainide. Only with metoprolol 25mg daily and report compliance (though dose state that e might have accidentally doubled up last night). Denies lightheadedness/dizziness.     Reports that he saw his dermatologist after his April acute visit with me, and the bumps on the back of his head was actually a Shingles infection. States this has since resolved. He had the Zostavax vaccine but not the Shingrix. Let him know that he can get Shingrix at this time for updated immunity. Coupon card given today.   History of Present Illness      Objective   Vital Signs:  /82 (BP Location: Left arm, Patient Position: Sitting, Cuff Size: Adult)   Pulse 52   Temp 98 °F (36.7 °C)   Resp 18   Ht 185.4 cm (73\")   Wt 101 kg (223 lb)   SpO2 98%   BMI 29.42 kg/m²   Estimated body mass index is 29.42 kg/m² as calculated from the following:    Height as of this encounter: 185.4 cm (73\").    Weight as of this encounter: 101 kg (223 lb).        Physical Exam  Constitutional:       General: He is not in acute distress.     Appearance: Normal appearance.   HENT:      Right Ear: There is impacted cerumen.      Left Ear: There is impacted cerumen.      Mouth/Throat:      Mouth: Mucous membranes are moist.      Pharynx: Oropharynx is clear. No posterior oropharyngeal erythema.   Eyes:      Extraocular Movements: Extraocular movements intact.      Conjunctiva/sclera: Conjunctivae normal.      Pupils: Pupils are equal, round, and reactive to light.   Cardiovascular:      Rate and Rhythm: Normal rate and " regular rhythm.      Heart sounds: No murmur heard.  Pulmonary:      Effort: Pulmonary effort is normal. No respiratory distress.      Breath sounds: Normal breath sounds. No wheezing.   Musculoskeletal:         General: No swelling or deformity. Normal range of motion.      Right lower leg: No edema.      Left lower leg: No edema.   Skin:     General: Skin is warm and dry.   Neurological:      General: No focal deficit present.      Mental Status: He is oriented to person, place, and time. Mental status is at baseline.   Psychiatric:         Mood and Affect: Mood normal.         Behavior: Behavior normal.     Result Review :               Current Outpatient Medications:     atorvastatin (LIPITOR) 20 MG tablet, TAKE 1 TABLET BY MOUTH EVERY EVENING, Disp: 90 tablet, Rfl: 0    desonide (DESOWEN) 0.05 % cream, Apply 1 Application topically to the appropriate area as directed 2 (Two) Times a Day., Disp: , Rfl:     diphenhydrAMINE (BENADRYL) 25 mg capsule, Take 1 capsule by mouth As Needed., Disp: , Rfl:     ketoconazole (NIZORAL) 2 % cream, Apply 1 Application topically to the appropriate area as directed Daily., Disp: , Rfl:     melatonin 3 MG tablet, Take 1 tablet by mouth Daily., Disp: , Rfl:     metoprolol succinate XL (TOPROL-XL) 25 MG 24 hr tablet, Take 1 tablet by mouth Daily., Disp: , Rfl:     omeprazole (priLOSEC) 20 MG capsule, TAKE 1 CAPSULE BY MOUTH DAILY, Disp: 90 capsule, Rfl: 0    sildenafil (VIAGRA) 50 MG tablet, Take 1 tablet by mouth As Needed for Erectile Dysfunction., Disp: 4 tablet, Rfl: 3      Assessment and Plan   Diagnoses and all orders for this visit:    1. Annual physical exam (Primary)       I personally reviewed PMH, PSxH, family history, allergies, social history, and medication list. I recommend that patient stay UTD on all age- and population-recommended vaccinations, avoid tobacco use, and use moderation if drinking alcohol (no more than 14 drinks per week). I provided information on  healthy eating habits and exercise in Wrap Up tab.        Follow Up   Return in about 1 year (around 6/3/2026) for annual physical, lab work.    Patient was given instructions and counseling regarding his condition or for health maintenance advice. Please see specific information pulled into the AVS if appropriate.     Margot Mendez MD

## 2025-06-03 ENCOUNTER — OFFICE VISIT (OUTPATIENT)
Dept: INTERNAL MEDICINE | Facility: CLINIC | Age: 70
End: 2025-06-03
Payer: COMMERCIAL

## 2025-06-03 VITALS
BODY MASS INDEX: 29.55 KG/M2 | TEMPERATURE: 98 F | WEIGHT: 223 LBS | OXYGEN SATURATION: 98 % | HEIGHT: 73 IN | RESPIRATION RATE: 18 BRPM | SYSTOLIC BLOOD PRESSURE: 138 MMHG | HEART RATE: 52 BPM | DIASTOLIC BLOOD PRESSURE: 82 MMHG

## 2025-06-03 DIAGNOSIS — Z00.00 ANNUAL PHYSICAL EXAM: Primary | ICD-10-CM

## 2025-06-03 PROBLEM — D12.6 ADENOMATOUS POLYP OF COLON: Status: RESOLVED | Noted: 2023-08-10 | Resolved: 2025-06-03

## 2025-06-03 PROCEDURE — 99397 PER PM REEVAL EST PAT 65+ YR: CPT | Performed by: STUDENT IN AN ORGANIZED HEALTH CARE EDUCATION/TRAINING PROGRAM

## 2025-06-03 RX ORDER — DESONIDE 0.5 MG/G
1 CREAM TOPICAL 2 TIMES DAILY
COMMUNITY
Start: 2025-04-08

## 2025-06-03 RX ORDER — KETOCONAZOLE 20 MG/G
1 CREAM TOPICAL DAILY
COMMUNITY
Start: 2025-04-09

## 2025-06-23 DIAGNOSIS — E78.2 MIXED HYPERLIPIDEMIA: ICD-10-CM

## 2025-06-23 RX ORDER — ATORVASTATIN CALCIUM 20 MG/1
20 TABLET, FILM COATED ORAL EVERY EVENING
Qty: 90 TABLET | Refills: 0 | Status: SHIPPED | OUTPATIENT
Start: 2025-06-23

## 2025-08-18 ENCOUNTER — TELEPHONE (OUTPATIENT)
Dept: INTERNAL MEDICINE | Facility: CLINIC | Age: 70
End: 2025-08-18
Payer: COMMERCIAL

## (undated) DEVICE — SNAR POLYP SENSATION STDOVL 27 240 BX40

## (undated) DEVICE — ADAPT CLN BIOGUARD AIR/H2O DISP

## (undated) DEVICE — SNAR POLYP CAPTIVATOR RND STFF 2.4 240CM 10MM 1P/U

## (undated) DEVICE — TUBING, SUCTION, 1/4" X 10', STRAIGHT: Brand: MEDLINE

## (undated) DEVICE — THE SINGLE USE ETRAP – POLYP TRAP IS USED FOR SUCTION RETRIEVAL OF ENDOSCOPICALLY REMOVED POLYPS.: Brand: ETRAP

## (undated) DEVICE — LN SMPL CO2 SHTRM SD STREAM W/M LUER

## (undated) DEVICE — KT ORCA ORCAPOD DISP STRL

## (undated) DEVICE — CANN NASL CO2 TRULINK W/O2 A/

## (undated) DEVICE — THE TORRENT IRRIGATION SCOPE CONNECTOR IS USED WITH THE TORRENT IRRIGATION TUBING TO PROVIDE IRRIGATION FLUIDS SUCH AS STERILE WATER DURING GASTROINTESTINAL ENDOSCOPIC PROCEDURES WHEN USED IN CONJUNCTION WITH AN IRRIGATION PUMP (OR ELECTROSURGICAL UNIT).: Brand: TORRENT

## (undated) DEVICE — CANN O2 ETCO2 FITS ALL CONN CO2 SMPL A/ 7IN DISP LF

## (undated) DEVICE — Device: Brand: DEFENDO AIR/WATER/SUCTION AND BIOPSY VALVE

## (undated) DEVICE — SINGLE-USE BIOPSY FORCEPS: Brand: RADIAL JAW 4

## (undated) DEVICE — SENSR O2 OXIMAX FNGR A/ 18IN NONSTR